# Patient Record
Sex: FEMALE | Race: WHITE | Employment: OTHER | URBAN - METROPOLITAN AREA
[De-identification: names, ages, dates, MRNs, and addresses within clinical notes are randomized per-mention and may not be internally consistent; named-entity substitution may affect disease eponyms.]

---

## 2018-09-15 ENCOUNTER — HOSPITAL ENCOUNTER (OUTPATIENT)
Dept: NUTRITION | Facility: HOSPITAL | Age: 58
End: 2018-09-16
Attending: INTERNAL MEDICINE | Admitting: INTERNAL MEDICINE

## 2018-09-15 LAB
ABS NEUT (OLG): 5.41 X10(3)/MCL (ref 2.1–9.2)
ALBUMIN SERPL-MCNC: 3.5 GM/DL (ref 3.4–5)
ALBUMIN/GLOB SERPL: 1 {RATIO}
ALP SERPL-CCNC: 67 UNIT/L (ref 45–117)
ALT SERPL-CCNC: 26 UNIT/L (ref 13–56)
AMPHET UR QL SCN: NEGATIVE
APPEARANCE, UA: CLEAR
AST SERPL-CCNC: 41 UNIT/L (ref 15–37)
BARBITURATE SCN PRESENT UR: NEGATIVE
BASOPHILS # BLD AUTO: 0.06 X10(3)/MCL (ref 0–0.2)
BASOPHILS NFR BLD AUTO: 0.6 % (ref 0–1)
BENZODIAZ UR QL SCN: NEGATIVE
BILIRUB SERPL-MCNC: 0.8 MG/DL (ref 0.2–1)
BILIRUB UR QL STRIP: NEGATIVE
BILIRUBIN DIRECT+TOT PNL SERPL-MCNC: 0.1 MG/DL (ref 0–0.2)
BILIRUBIN DIRECT+TOT PNL SERPL-MCNC: 0.7 MG/DL (ref 0–1)
BUN SERPL-MCNC: 12 MG/DL (ref 7–18)
CALCIUM SERPL-MCNC: 8.7 MG/DL (ref 8.5–10.1)
CANNABINOIDS UR QL SCN: NEGATIVE
CHLORIDE SERPL-SCNC: 104 MMOL/L (ref 98–107)
CO2 SERPL-SCNC: 25 MMOL/L (ref 21–32)
COCAINE UR QL SCN: ABNORMAL
COLOR UR: YELLOW
CREAT SERPL-MCNC: 1.01 MG/DL (ref 0.55–1.02)
EOSINOPHIL # BLD AUTO: 0.14 X10(3)/MCL (ref 0–0.9)
EOSINOPHIL NFR BLD AUTO: 1.5 % (ref 0–6.4)
ERYTHROCYTE [DISTWIDTH] IN BLOOD BY AUTOMATED COUNT: 12.9 % (ref 11.5–17)
GLOBULIN SER-MCNC: 5 GM/DL (ref 2–4)
GLUCOSE (UA): NEGATIVE
GLUCOSE SERPL-MCNC: 89 MG/DL (ref 74–106)
HCO3 UR-SCNC: 28.4 MMOL/L (ref 22–26)
HCT VFR BLD AUTO: 51.1 % (ref 37–47)
HGB BLD-MCNC: 16.6 GM/DL (ref 12–16)
HGB UR QL STRIP: NEGATIVE
IMM GRANULOCYTES # BLD AUTO: 0.06 10*3/UL (ref 0–0.02)
IMM GRANULOCYTES NFR BLD AUTO: 0.6 % (ref 0–0.43)
KETONES UR QL STRIP: NEGATIVE
LACTATE SERPL-SCNC: 1.9 MMOL/L (ref 0.4–2)
LEUKOCYTE ESTERASE UR QL STRIP: NEGATIVE
LYMPHOCYTES # BLD AUTO: 2.67 X10(3)/MCL (ref 0.6–4.6)
LYMPHOCYTES NFR BLD AUTO: 28.5 % (ref 16–44)
MCH RBC QN AUTO: 32.2 PG (ref 27–31)
MCHC RBC AUTO-ENTMCNC: 32.5 GM/DL (ref 33–36)
MCV RBC AUTO: 99.2 FL (ref 80–94)
METHADONE UR QL SCN: NEGATIVE
MONOCYTES # BLD AUTO: 1.02 X10(3)/MCL (ref 0.1–1.3)
MONOCYTES NFR BLD AUTO: 10.9 % (ref 4–12.1)
NEUTROPHILS # BLD AUTO: 5.41 X10(3)/MCL (ref 2.1–9.2)
NEUTROPHILS NFR BLD AUTO: 57.9 % (ref 43–73)
NITRITE UR QL STRIP: NEGATIVE
NRBC BLD AUTO-RTO: 0 % (ref 0–0.2)
OPIATES UR QL SCN: NEGATIVE
PCO2 BLDA: 32 MMHG (ref 35–45)
PCP UR QL: NEGATIVE
PH SMN: 7.55 [PH] (ref 7.35–7.45)
PH UR STRIP.AUTO: 7 [PH] (ref 5–7.5)
PH UR STRIP: 7 [PH] (ref 5–7)
PLATELET # BLD AUTO: 213 X10(3)/MCL (ref 130–400)
PMV BLD AUTO: 9.7 FL (ref 7.4–10.4)
PO2 BLDA: 129 MMHG (ref 80–100)
POC ALLENS TEST: NEGATIVE
POC BE: 6 MMOL/L (ref -2–3)
POC SAMPLESOURCE: NORMAL
POC SATURATED O2: 99 % (ref 96–97)
POC SITE: NORMAL
POC TCO2: 29 MMOL/L (ref 24–29)
POTASSIUM SERPL-SCNC: 5.1 MMOL/L (ref 3.5–5.1)
PROT SERPL-MCNC: 8.4 GM/DL (ref 6.4–8.2)
PROT UR QL STRIP: NEGATIVE
RBC # BLD AUTO: 5.15 X10(6)/MCL (ref 4.2–5.4)
SODIUM SERPL-SCNC: 137 MMOL/L (ref 136–145)
SP GR UR STRIP: 1.01 (ref 1–1.03)
TROPONIN I SERPL-MCNC: <0.02 NG/ML (ref 0.02–0.49)
UROBILINOGEN UR STRIP-ACNC: NEGATIVE
WBC # SPEC AUTO: 9.4 X10(3)/MCL (ref 4.5–11.5)

## 2018-09-16 LAB
ERYTHROCYTE [DISTWIDTH] IN BLOOD BY AUTOMATED COUNT: 12.4 % (ref 11.5–17)
HCT VFR BLD AUTO: 50.3 % (ref 37–47)
HGB BLD-MCNC: 16 GM/DL (ref 12–16)
MCH RBC QN AUTO: 32.3 PG (ref 27–31)
MCHC RBC AUTO-ENTMCNC: 31.8 GM/DL (ref 33–36)
MCV RBC AUTO: 101.4 FL (ref 80–94)
PLATELET # BLD AUTO: 227 X10(3)/MCL (ref 130–400)
PMV BLD AUTO: 9.7 FL (ref 9.4–12.4)
RBC # BLD AUTO: 4.96 X10(6)/MCL (ref 4.2–5.4)
WBC # SPEC AUTO: 12.9 X10(3)/MCL (ref 4.5–11.5)

## 2022-05-02 NOTE — HISTORICAL [facility]ER
This is a historical note converted from Nicole. Formatting and pictures may have been removed.  Please reference Nicole for original formatting and attached multimedia. Chief Complaint  pt reports cough x 1 month denies fever. Has taken amoxcillin 500 mg  History of Present Illness  58-year-old white female?with past medical history of COPD,schizophrenia, bipolar?who presented to the ER today with complaints of shortness of breath gets been going on and off for the past 1 monthbut now has been?getting progressively worse to the point that?she is not able to walk without shortness of breath.? Patient reports no fever no chills?.? Chest x-ray done in the ER was negative.? Shes been started on steroids?and has been admitted to hospitalist service for further management and care.? Patient reports?she does cocaine?in the last cocaine use was this morning?did not?do it IV but instead she sniffed it.? UDS was positive for cocaine?otherwise all other lab work looks good?except for slightly elevated H&H.  Review of Systems  except as documented all other systems reviewed and negative  Physical Exam  General:?well-developed well-nourished in no acute distress  Eye: PERRLA, EOMI,  HENT:?atraumatic normocephalic  Neck: full range of motion,  Respiratory:?decreased air entry, occasional wheezing  Cardiovascular:?regular rate and rhythm without murmurs,  Gastrointestinal:?soft, non-tender, non-distended  Genitourinary: no CVA tenderness to palpation  Musculoskeletal:?full range of motion of all extremities/spine without limitation or discomfort  Integumentary: no rashes or skin lesions present  Neurologic: alert awake  ?  ?  Assessment/Plan  COPD with acute exacerbation  Cough  ?  cocaine abuse  ?  patient has been started on IV steroids, Levaquin, DuoNeb?every 4 hours and when necessary. no beta blocker secondary to cocaine?history.? Right now patient is saturating?around 90% on room air.? I will also order 2-D echo?as patient  does report she does have a history of heart valve disorder?? But chest x-ray as such did not show any pulmonary edema.? I will also order one set of cardiac enzyme  ?  ?  Prophylaxis: Lovenox   Problem List/Past Medical History  Ongoing  Cholesterol  Cocaine user  COPD - Chronic obstructive pulmonary disease  Heart valve disorder  Hepatitis C  Historical  No qualifying data  Procedure/Surgical History  c section  lt leg  rt arm  rt leg  uterine fibroids x 2   Medications  Inpatient  aspirin, 325 mg= 1 tab(s), Oral, Daily  cloniDINE 0.1 mg oral tablet, 0.1 mg= 1 tab(s), Oral, q6hr, PRN  DuoNeb 0.5 mg-2.5 mg/3 mL inhalation solution, 3 mL, NEB, q4hr  levofloxacin IVPB ( Levaquin ), 750 mg= 150 mL, IV, q24hr  Norco 5 mg-325 mg oral tablet, 1 tab(s), Oral, q4hr, PRN  Solumedrol IV push / IM, 80 mg= 2 mL, IV Push, [id number]  Xanax 0.25 mg oral tablet, 0.25 mg= 1 tab(s), Oral, TID, PRN  Zofran  Home  QUETIAPINE FUMARATE 200 MG TABS, 200 mg= 1 tab(s), Oral, qPM  Seroquel 300 mg tablet, 600 mg= 2 tab(s), Oral, qPM  SYMBICORT -4.5  Allergies  No Known Medication Allergies  Social History  Alcohol  Current, Liquor, 1-2 times per week, 09/15/2018  Substance Abuse  Current, Cocaine, 1-2 times per month, 09/15/2018  Tobacco  Current every day smoker Use:. Cigarettes Type:. 10 per day., 09/15/2018

## 2023-11-09 ENCOUNTER — OFFICE VISIT (OUTPATIENT)
Dept: FAMILY MEDICINE | Facility: CLINIC | Age: 63
End: 2023-11-09
Payer: MEDICARE

## 2023-11-09 ENCOUNTER — TELEPHONE (OUTPATIENT)
Dept: FAMILY MEDICINE | Facility: CLINIC | Age: 63
End: 2023-11-09

## 2023-11-09 ENCOUNTER — LAB VISIT (OUTPATIENT)
Dept: LAB | Facility: HOSPITAL | Age: 63
End: 2023-11-09
Attending: NURSE PRACTITIONER
Payer: MEDICARE

## 2023-11-09 VITALS
SYSTOLIC BLOOD PRESSURE: 148 MMHG | RESPIRATION RATE: 18 BRPM | OXYGEN SATURATION: 94 % | HEART RATE: 86 BPM | BODY MASS INDEX: 34.34 KG/M2 | WEIGHT: 218.81 LBS | HEIGHT: 67 IN | TEMPERATURE: 98 F | DIASTOLIC BLOOD PRESSURE: 78 MMHG

## 2023-11-09 DIAGNOSIS — F33.1 MODERATE EPISODE OF RECURRENT MAJOR DEPRESSIVE DISORDER: ICD-10-CM

## 2023-11-09 DIAGNOSIS — F31.12 BIPOLAR AFFECTIVE DISORDER, CURRENTLY MANIC, MODERATE: ICD-10-CM

## 2023-11-09 DIAGNOSIS — G45.8 OTHER SPECIFIED TRANSIENT CEREBRAL ISCHEMIAS: ICD-10-CM

## 2023-11-09 DIAGNOSIS — J44.9 CHRONIC OBSTRUCTIVE PULMONARY DISEASE, UNSPECIFIED COPD TYPE: ICD-10-CM

## 2023-11-09 DIAGNOSIS — R79.9 ABNORMAL BLOOD CHEMISTRY: ICD-10-CM

## 2023-11-09 DIAGNOSIS — R03.0 ELEVATED BLOOD PRESSURE READING: ICD-10-CM

## 2023-11-09 DIAGNOSIS — L84 CALLUS OF FOOT: ICD-10-CM

## 2023-11-09 DIAGNOSIS — R29.818 NEUROLOGICAL ABNORMALITY: Primary | ICD-10-CM

## 2023-11-09 DIAGNOSIS — I38 VALVULAR HEART DISEASE: ICD-10-CM

## 2023-11-09 DIAGNOSIS — M16.0 PRIMARY OSTEOARTHRITIS OF BOTH HIPS: ICD-10-CM

## 2023-11-09 DIAGNOSIS — I73.9 PVD (PERIPHERAL VASCULAR DISEASE): ICD-10-CM

## 2023-11-09 PROBLEM — H26.9 CATARACTS, BILATERAL: Status: ACTIVE | Noted: 2023-11-09

## 2023-11-09 PROBLEM — F31.9 BIPOLAR DISORDER: Status: ACTIVE | Noted: 2023-11-09

## 2023-11-09 PROBLEM — F32.A DEPRESSION: Status: ACTIVE | Noted: 2023-11-09

## 2023-11-09 LAB
ALBUMIN SERPL-MCNC: 3.9 G/DL (ref 3.4–4.8)
ALBUMIN/GLOB SERPL: 0.8 RATIO (ref 1.1–2)
ALP SERPL-CCNC: 60 UNIT/L (ref 40–150)
ALT SERPL-CCNC: 19 UNIT/L (ref 0–55)
APPEARANCE UR: ABNORMAL
AST SERPL-CCNC: 26 UNIT/L (ref 5–34)
BACTERIA #/AREA URNS AUTO: ABNORMAL /HPF
BASOPHILS # BLD AUTO: 0.09 X10(3)/MCL
BASOPHILS NFR BLD AUTO: 0.9 %
BILIRUB SERPL-MCNC: 0.8 MG/DL
BILIRUB UR QL STRIP.AUTO: ABNORMAL
BUN SERPL-MCNC: 10.3 MG/DL (ref 9.8–20.1)
CALCIUM SERPL-MCNC: 9.9 MG/DL (ref 8.4–10.2)
CHLORIDE SERPL-SCNC: 105 MMOL/L (ref 98–107)
CO2 SERPL-SCNC: 27 MMOL/L (ref 23–31)
COLOR UR AUTO: YELLOW
CREAT SERPL-MCNC: 0.77 MG/DL (ref 0.55–1.02)
EOSINOPHIL # BLD AUTO: 0.14 X10(3)/MCL (ref 0–0.9)
EOSINOPHIL NFR BLD AUTO: 1.4 %
ERYTHROCYTE [DISTWIDTH] IN BLOOD BY AUTOMATED COUNT: 12.8 % (ref 11.5–17)
EST. AVERAGE GLUCOSE BLD GHB EST-MCNC: 105.4 MG/DL
GFR SERPLBLD CREATININE-BSD FMLA CKD-EPI: >60 MLS/MIN/1.73/M2
GLOBULIN SER-MCNC: 4.8 GM/DL (ref 2.4–3.5)
GLUCOSE SERPL-MCNC: 97 MG/DL (ref 82–115)
GLUCOSE UR QL STRIP.AUTO: NEGATIVE
HBA1C MFR BLD: 5.3 %
HCT VFR BLD AUTO: 53.7 % (ref 37–47)
HGB BLD-MCNC: 17.5 G/DL (ref 12–16)
IMM GRANULOCYTES # BLD AUTO: 0.05 X10(3)/MCL (ref 0–0.04)
IMM GRANULOCYTES NFR BLD AUTO: 0.5 %
KETONES UR QL STRIP.AUTO: NEGATIVE
LEUKOCYTE ESTERASE UR QL STRIP.AUTO: NEGATIVE
LYMPHOCYTES # BLD AUTO: 2.21 X10(3)/MCL (ref 0.6–4.6)
LYMPHOCYTES NFR BLD AUTO: 22.3 %
MCH RBC QN AUTO: 32.3 PG (ref 27–31)
MCHC RBC AUTO-ENTMCNC: 32.6 G/DL (ref 33–36)
MCV RBC AUTO: 99.1 FL (ref 80–94)
MONOCYTES # BLD AUTO: 1.02 X10(3)/MCL (ref 0.1–1.3)
MONOCYTES NFR BLD AUTO: 10.3 %
NEUTROPHILS # BLD AUTO: 6.4 X10(3)/MCL (ref 2.1–9.2)
NEUTROPHILS NFR BLD AUTO: 64.6 %
NITRITE UR QL STRIP.AUTO: NEGATIVE
NRBC BLD AUTO-RTO: 0 %
PH UR STRIP.AUTO: 5 [PH]
PLATELET # BLD AUTO: 242 X10(3)/MCL (ref 130–400)
PMV BLD AUTO: 9.7 FL (ref 7.4–10.4)
POTASSIUM SERPL-SCNC: 4.4 MMOL/L (ref 3.5–5.1)
PROT SERPL-MCNC: 8.7 GM/DL (ref 5.8–7.6)
PROT UR QL STRIP.AUTO: 100
RBC # BLD AUTO: 5.42 X10(6)/MCL (ref 4.2–5.4)
RBC #/AREA URNS AUTO: ABNORMAL /HPF
RBC UR QL AUTO: NEGATIVE
SODIUM SERPL-SCNC: 141 MMOL/L (ref 136–145)
SP GR UR STRIP.AUTO: >=1.03 (ref 1–1.03)
SQUAMOUS #/AREA URNS AUTO: ABNORMAL /HPF
TSH SERPL-ACNC: 0.75 UIU/ML (ref 0.35–4.94)
UROBILINOGEN UR STRIP-ACNC: 1
WBC # SPEC AUTO: 9.91 X10(3)/MCL (ref 4.5–11.5)
WBC #/AREA URNS AUTO: ABNORMAL /HPF

## 2023-11-09 PROCEDURE — 3044F HG A1C LEVEL LT 7.0%: CPT | Mod: CPTII,,, | Performed by: NURSE PRACTITIONER

## 2023-11-09 PROCEDURE — 36415 COLL VENOUS BLD VENIPUNCTURE: CPT

## 2023-11-09 PROCEDURE — 83036 HEMOGLOBIN GLYCOSYLATED A1C: CPT

## 2023-11-09 PROCEDURE — 3077F SYST BP >= 140 MM HG: CPT | Mod: CPTII,,, | Performed by: NURSE PRACTITIONER

## 2023-11-09 PROCEDURE — 3008F BODY MASS INDEX DOCD: CPT | Mod: CPTII,,, | Performed by: NURSE PRACTITIONER

## 2023-11-09 PROCEDURE — 3288F PR FALLS RISK ASSESSMENT DOCUMENTED: ICD-10-PCS | Mod: CPTII,,, | Performed by: NURSE PRACTITIONER

## 2023-11-09 PROCEDURE — 99204 OFFICE O/P NEW MOD 45 MIN: CPT | Mod: ,,, | Performed by: NURSE PRACTITIONER

## 2023-11-09 PROCEDURE — 1100F PR PT FALLS ASSESS DOC 2+ FALLS/FALL W/INJURY/YR: ICD-10-PCS | Mod: CPTII,,, | Performed by: NURSE PRACTITIONER

## 2023-11-09 PROCEDURE — 84443 ASSAY THYROID STIM HORMONE: CPT

## 2023-11-09 PROCEDURE — 99204 PR OFFICE/OUTPT VISIT, NEW, LEVL IV, 45-59 MIN: ICD-10-PCS | Mod: ,,, | Performed by: NURSE PRACTITIONER

## 2023-11-09 PROCEDURE — 1159F PR MEDICATION LIST DOCUMENTED IN MEDICAL RECORD: ICD-10-PCS | Mod: CPTII,,, | Performed by: NURSE PRACTITIONER

## 2023-11-09 PROCEDURE — 1159F MED LIST DOCD IN RCRD: CPT | Mod: CPTII,,, | Performed by: NURSE PRACTITIONER

## 2023-11-09 PROCEDURE — 1160F RVW MEDS BY RX/DR IN RCRD: CPT | Mod: CPTII,,, | Performed by: NURSE PRACTITIONER

## 2023-11-09 PROCEDURE — 80053 COMPREHEN METABOLIC PANEL: CPT

## 2023-11-09 PROCEDURE — 3077F PR MOST RECENT SYSTOLIC BLOOD PRESSURE >= 140 MM HG: ICD-10-PCS | Mod: CPTII,,, | Performed by: NURSE PRACTITIONER

## 2023-11-09 PROCEDURE — 3288F FALL RISK ASSESSMENT DOCD: CPT | Mod: CPTII,,, | Performed by: NURSE PRACTITIONER

## 2023-11-09 PROCEDURE — 1100F PTFALLS ASSESS-DOCD GE2>/YR: CPT | Mod: CPTII,,, | Performed by: NURSE PRACTITIONER

## 2023-11-09 PROCEDURE — 3008F PR BODY MASS INDEX (BMI) DOCUMENTED: ICD-10-PCS | Mod: CPTII,,, | Performed by: NURSE PRACTITIONER

## 2023-11-09 PROCEDURE — 3078F PR MOST RECENT DIASTOLIC BLOOD PRESSURE < 80 MM HG: ICD-10-PCS | Mod: CPTII,,, | Performed by: NURSE PRACTITIONER

## 2023-11-09 PROCEDURE — 3078F DIAST BP <80 MM HG: CPT | Mod: CPTII,,, | Performed by: NURSE PRACTITIONER

## 2023-11-09 PROCEDURE — 85025 COMPLETE CBC W/AUTO DIFF WBC: CPT

## 2023-11-09 PROCEDURE — 1160F PR REVIEW ALL MEDS BY PRESCRIBER/CLIN PHARMACIST DOCUMENTED: ICD-10-PCS | Mod: CPTII,,, | Performed by: NURSE PRACTITIONER

## 2023-11-09 PROCEDURE — 3044F PR MOST RECENT HEMOGLOBIN A1C LEVEL <7.0%: ICD-10-PCS | Mod: CPTII,,, | Performed by: NURSE PRACTITIONER

## 2023-11-09 RX ORDER — NAPROXEN 500 MG/1
500 TABLET ORAL 2 TIMES DAILY WITH MEALS
Qty: 60 TABLET | Refills: 0 | Status: SHIPPED | OUTPATIENT
Start: 2023-11-09

## 2023-11-09 RX ORDER — BUDESONIDE AND FORMOTEROL FUMARATE DIHYDRATE 160; 4.5 UG/1; UG/1
2 AEROSOL RESPIRATORY (INHALATION) EVERY 12 HOURS
Qty: 10.2 G | Refills: 3 | Status: SHIPPED | OUTPATIENT
Start: 2023-11-09 | End: 2024-03-08 | Stop reason: SDUPTHER

## 2023-11-09 RX ORDER — QUETIAPINE FUMARATE 200 MG/1
200 TABLET, FILM COATED ORAL NIGHTLY
COMMUNITY
Start: 2023-09-12

## 2023-11-09 RX ORDER — QUETIAPINE FUMARATE 300 MG/1
300 TABLET, FILM COATED ORAL 2 TIMES DAILY
COMMUNITY
Start: 2023-09-12

## 2023-11-09 NOTE — PROGRESS NOTES
Please inform of labs results    1) liver, kidney, thyroid, WNL; No DM  Rx for Naproxen sent to [facility]    2) blood count is elevated; this is due to hx of smoking; we will monitor; instruct to decrease daily cigarette intake; we will monitor  Remaining labs without any concerning findings    3) Urine negative for UTI; but she does have protein in urine  I will repeat this at next appt    Keep appt for follow up

## 2023-11-09 NOTE — ASSESSMENT & PLAN NOTE
Stable  Continue Seroquel 200 mg po daily and Seroquel 300 mg po daily.   Keep appt with Physiatrist is Dr. Lisa Jackson at [facility].

## 2023-11-09 NOTE — ASSESSMENT & PLAN NOTE
Neuro exam intact today  Will obtain CT head  Referral placed to Neurology  Long discussion with patient; instructed to report to ED for any stroke-like symptoms  Instructed to continue to monitor symptoms  Call 911 for any weakness, syncope, dizziness, slurred speech, one-sided numbness/tingling, CP, SOB, and/or worsening symptoms  Pt is agreeable to plan and verbalizes understanding

## 2023-11-09 NOTE — ASSESSMENT & PLAN NOTE
+ chronic cough/wheezing  Resume Symbicort; Rx sent  Continue nebulizer prn  Refer to Pulmonology for PFT

## 2023-11-09 NOTE — TELEPHONE ENCOUNTER
----- Message from Aviva Jones sent at 11/9/2023  2:28 PM CST -----  Regarding: advice  Type:  Needs Medical Advice    Who Called: Kimberley from [facility]  Symptoms (please be specific):    How long has patient had these symptoms:    Pharmacy name and phone #:    Would the patient rather a call back or a response via [facility]?   Best Call Back Number: [phone]  Additional Information: called about pt needing an order for a scooter. Please advise (fax [phone]
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
## 2023-11-13 NOTE — TELEPHONE ENCOUNTER
Returned call to [facility]. Spoke to Becky.   Stated and requested to Becky that paperwork is needing to be faxed to this office to start process for scooter. Becky stated that approval is pending by case management team for DME equipment. Once approved paperwork will be faxed to this office for NP to fill out.  Reference ID#: [id number]

## 2023-11-16 ENCOUNTER — TELEPHONE (OUTPATIENT)
Dept: FAMILY MEDICINE | Facility: CLINIC | Age: 63
End: 2023-11-16
Payer: MEDICARE

## 2023-11-16 NOTE — TELEPHONE ENCOUNTER
----- Message from Lakeisha Dunlap LPN sent at 11/16/2023  2:50 PM CST -----  Regarding: FW: med advice    ----- Message -----  From: Karen Cook  Sent: 11/16/2023   9:22 AM CST  To: Kip Padilla Staff  Subject: med advice                                       .Type:  Needs Medical Advice    Who Called: Pt  Symptoms (please be specific):    How long has patient had these symptoms:    Pharmacy name and phone #:    Would the patient rather a call back or a response via [facility]? Call back  Best Call Back Number: [phone]  Additional Information: Pt wants to know if she can come and give urine Monday since her ultrasound and CT scan was rescheduled.

## 2023-11-16 NOTE — TELEPHONE ENCOUNTER
I have that patient has a u/s and ct scan scheduled for Monday 11.20.23. I called patient and no answer. Left message to return call to office. Gilda

## 2023-11-17 NOTE — TELEPHONE ENCOUNTER
Patient mentioned she needed to repeat at urine test on Thursday.  I advised her of the below per Miguel :  3) Urine negative for UTI; but she does have protein in urine  I will repeat this at next appt      I advised her that her next appointment with Miguel is on Tuesday 12.12.23. She voiced understanding. Patient also advised to keep her CT and U/S appointment scheduled for Monday 11.20.23. She voiced understanding. I explained to her where to go for her scans. She voiced understanding. Gilda

## 2023-11-20 ENCOUNTER — HOSPITAL ENCOUNTER (OUTPATIENT)
Dept: RADIOLOGY | Facility: HOSPITAL | Age: 63
Discharge: HOME | End: 2023-11-20
Attending: NURSE PRACTITIONER
Payer: MEDICARE

## 2023-11-20 DIAGNOSIS — G45.8 OTHER SPECIFIED TRANSIENT CEREBRAL ISCHEMIAS: ICD-10-CM

## 2023-11-20 DIAGNOSIS — R29.818 NEUROLOGICAL ABNORMALITY: ICD-10-CM

## 2023-11-20 DIAGNOSIS — I73.9 PVD (PERIPHERAL VASCULAR DISEASE): ICD-10-CM

## 2023-12-06 ENCOUNTER — TELEPHONE (OUTPATIENT)
Dept: FAMILY MEDICINE | Facility: CLINIC | Age: 63
End: 2023-12-06
Payer: MEDICARE

## 2023-12-06 ENCOUNTER — HOSPITAL ENCOUNTER (OUTPATIENT)
Dept: RADIOLOGY | Facility: HOSPITAL | Age: 63
Discharge: HOME | End: 2023-12-06
Attending: NURSE PRACTITIONER
Payer: MEDICARE

## 2023-12-06 DIAGNOSIS — I73.9 PVD (PERIPHERAL VASCULAR DISEASE): Primary | ICD-10-CM

## 2023-12-06 PROCEDURE — 70450 CT HEAD/BRAIN W/O DYE: CPT | Mod: TC

## 2023-12-06 PROCEDURE — 93925 LOWER EXTREMITY STUDY: CPT | Mod: TC

## 2023-12-06 NOTE — TELEPHONE ENCOUNTER
----- Message from Lesly Nathan NP sent at 12/6/2023  2:27 PM CST -----  Please inform of CT results    CT reveals no acute changes, but there are some changes in her brain that needs to be addressed  Recently referred to Neurology, has she received an appt.?

## 2023-12-06 NOTE — PROGRESS NOTES
Please inform of US result    1) US reveals that she likely has severe blockage in her legs which needs further testing    I will place referral for vascular doctor in order to obtain further testing of her legs

## 2023-12-06 NOTE — TELEPHONE ENCOUNTER
----- Message from Trisha Aranda sent at 12/6/2023  4:07 PM CST -----  .Type:  Patient Returning Call    Who Called:Mode gordon  Who Left Message for Patient:Mode  Does the patient know what this is regarding?:Referral  Would the patient rather a call back or a response via [facility]?   Best Call Back Number:[phone]  Additional Information: Please call back about referral

## 2023-12-06 NOTE — TELEPHONE ENCOUNTER
Referral sent to Neurology on 11/9/23. Pt states no appt received.   Please follow up on referral; call pt if there is an appt. Made with date and time.  Thanks...

## 2023-12-07 ENCOUNTER — TELEPHONE (OUTPATIENT)
Dept: FAMILY MEDICINE | Facility: CLINIC | Age: 63
End: 2023-12-07
Payer: MEDICARE

## 2023-12-07 DIAGNOSIS — I73.9 CLAUDICATION: ICD-10-CM

## 2023-12-07 DIAGNOSIS — I73.9 PVD (PERIPHERAL VASCULAR DISEASE): Primary | ICD-10-CM
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
## 2023-12-13 ENCOUNTER — OFFICE VISIT (OUTPATIENT)
Dept: VASCULAR SURGERY | Facility: CLINIC | Age: 63
End: 2023-12-13
Payer: MEDICARE

## 2023-12-13 VITALS
BODY MASS INDEX: 34.21 KG/M2 | SYSTOLIC BLOOD PRESSURE: 151 MMHG | DIASTOLIC BLOOD PRESSURE: 91 MMHG | HEART RATE: 81 BPM | HEIGHT: 67 IN | WEIGHT: 218 LBS

## 2023-12-13 DIAGNOSIS — I70.211 ATHEROSCLEROSIS OF NATIVE ARTERY OF RIGHT LOWER EXTREMITY WITH INTERMITTENT CLAUDICATION: ICD-10-CM

## 2023-12-13 DIAGNOSIS — G45.9 TIA (TRANSIENT ISCHEMIC ATTACK): ICD-10-CM

## 2023-12-13 DIAGNOSIS — I73.9 PVD (PERIPHERAL VASCULAR DISEASE): ICD-10-CM

## 2023-12-13 DIAGNOSIS — I70.212 ATHEROSCLEROSIS OF NATIVE ARTERY OF LEFT LOWER EXTREMITY WITH INTERMITTENT CLAUDICATION: Primary | ICD-10-CM

## 2023-12-13 DIAGNOSIS — I73.9 CLAUDICATION: ICD-10-CM

## 2023-12-13 PROCEDURE — 1100F PTFALLS ASSESS-DOCD GE2>/YR: CPT | Mod: CPTII,,, | Performed by: SURGERY

## 2023-12-13 PROCEDURE — 1100F PR PT FALLS ASSESS DOC 2+ FALLS/FALL W/INJURY/YR: ICD-10-PCS | Mod: CPTII,,, | Performed by: SURGERY

## 2023-12-13 PROCEDURE — 3008F PR BODY MASS INDEX (BMI) DOCUMENTED: ICD-10-PCS | Mod: CPTII,,, | Performed by: SURGERY

## 2023-12-13 PROCEDURE — 3008F BODY MASS INDEX DOCD: CPT | Mod: CPTII,,, | Performed by: SURGERY

## 2023-12-13 PROCEDURE — 1160F PR REVIEW ALL MEDS BY PRESCRIBER/CLIN PHARMACIST DOCUMENTED: ICD-10-PCS | Mod: CPTII,,, | Performed by: SURGERY

## 2023-12-13 PROCEDURE — 3080F DIAST BP >= 90 MM HG: CPT | Mod: CPTII,,, | Performed by: SURGERY

## 2023-12-13 PROCEDURE — 1159F MED LIST DOCD IN RCRD: CPT | Mod: CPTII,,, | Performed by: SURGERY

## 2023-12-13 PROCEDURE — 3288F PR FALLS RISK ASSESSMENT DOCUMENTED: ICD-10-PCS | Mod: CPTII,,, | Performed by: SURGERY

## 2023-12-13 PROCEDURE — 99205 PR OFFICE/OUTPT VISIT, NEW, LEVL V, 60-74 MIN: ICD-10-PCS | Mod: ,,, | Performed by: SURGERY

## 2023-12-13 PROCEDURE — 3288F FALL RISK ASSESSMENT DOCD: CPT | Mod: CPTII,,, | Performed by: SURGERY

## 2023-12-13 PROCEDURE — 3044F PR MOST RECENT HEMOGLOBIN A1C LEVEL <7.0%: ICD-10-PCS | Mod: CPTII,,, | Performed by: SURGERY

## 2023-12-13 PROCEDURE — 3044F HG A1C LEVEL LT 7.0%: CPT | Mod: CPTII,,, | Performed by: SURGERY

## 2023-12-13 PROCEDURE — 1160F RVW MEDS BY RX/DR IN RCRD: CPT | Mod: CPTII,,, | Performed by: SURGERY

## 2023-12-13 PROCEDURE — 3077F PR MOST RECENT SYSTOLIC BLOOD PRESSURE >= 140 MM HG: ICD-10-PCS | Mod: CPTII,,, | Performed by: SURGERY

## 2023-12-13 PROCEDURE — 3080F PR MOST RECENT DIASTOLIC BLOOD PRESSURE >= 90 MM HG: ICD-10-PCS | Mod: CPTII,,, | Performed by: SURGERY

## 2023-12-13 PROCEDURE — 1159F PR MEDICATION LIST DOCUMENTED IN MEDICAL RECORD: ICD-10-PCS | Mod: CPTII,,, | Performed by: SURGERY

## 2023-12-13 PROCEDURE — 3077F SYST BP >= 140 MM HG: CPT | Mod: CPTII,,, | Performed by: SURGERY

## 2023-12-13 PROCEDURE — 99205 OFFICE O/P NEW HI 60 MIN: CPT | Mod: ,,, | Performed by: SURGERY

## 2023-12-14 PROBLEM — G45.9 TIA (TRANSIENT ISCHEMIC ATTACK): Status: ACTIVE | Noted: 2023-12-14

## 2023-12-22 DIAGNOSIS — I73.9 PERIPHERAL VASCULAR DISEASE, UNSPECIFIED: ICD-10-CM

## 2023-12-22 DIAGNOSIS — I70.212 ATHEROSCLEROSIS OF NATIVE ARTERY OF LEFT LOWER EXTREMITY WITH INTERMITTENT CLAUDICATION: Primary | ICD-10-CM

## 2023-12-22 DIAGNOSIS — I70.211 ATHEROSCLEROSIS OF NATIVE ARTERY OF RIGHT LOWER EXTREMITY WITH INTERMITTENT CLAUDICATION: ICD-10-CM

## 2024-01-02 ENCOUNTER — CLINICAL SUPPORT (OUTPATIENT)
Dept: VASCULAR SURGERY | Facility: CLINIC | Age: 64
End: 2024-01-02
Payer: MEDICARE

## 2024-01-04 ENCOUNTER — TELEPHONE (OUTPATIENT)
Dept: FAMILY MEDICINE | Facility: CLINIC | Age: 64
End: 2024-01-04
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
## 2024-01-04 NOTE — TELEPHONE ENCOUNTER
----- Message from Aurora Knox sent at 1/4/2024  8:18 AM CST -----  Regarding: cancel appt  .Type:  Needs Medical Advice    Who Called: pt  Best Call Back Number: [phone]  Additional Information: pt not feeling well unable to make Todays appt   Pt request mess.

## 2024-01-04 NOTE — TELEPHONE ENCOUNTER
Noted...  Pt phoned office; is unable to make appointment today, not feeling well, will call back to r/s.

## 2024-01-10 ENCOUNTER — OFFICE VISIT (OUTPATIENT)
Dept: VASCULAR SURGERY | Facility: CLINIC | Age: 64
End: 2024-01-10
Payer: MEDICARE

## 2024-01-10 VITALS
HEART RATE: 72 BPM | BODY MASS INDEX: 34.21 KG/M2 | DIASTOLIC BLOOD PRESSURE: 74 MMHG | HEIGHT: 67 IN | SYSTOLIC BLOOD PRESSURE: 146 MMHG | WEIGHT: 218 LBS

## 2024-01-10 DIAGNOSIS — F14.10 COCAINE ABUSE: ICD-10-CM

## 2024-01-10 DIAGNOSIS — I70.211 ATHEROSCLEROSIS OF NATIVE ARTERY OF RIGHT LOWER EXTREMITY WITH INTERMITTENT CLAUDICATION: ICD-10-CM

## 2024-01-10 DIAGNOSIS — I70.212 ATHEROSCLEROSIS OF NATIVE ARTERY OF LEFT LOWER EXTREMITY WITH INTERMITTENT CLAUDICATION: Primary | ICD-10-CM

## 2024-01-10 PROCEDURE — 1159F MED LIST DOCD IN RCRD: CPT | Mod: CPTII,,, | Performed by: SURGERY

## 2024-01-10 PROCEDURE — 3078F DIAST BP <80 MM HG: CPT | Mod: CPTII,,, | Performed by: SURGERY

## 2024-01-10 PROCEDURE — 1160F RVW MEDS BY RX/DR IN RCRD: CPT | Mod: CPTII,,, | Performed by: SURGERY

## 2024-01-10 PROCEDURE — 1101F PT FALLS ASSESS-DOCD LE1/YR: CPT | Mod: CPTII,,, | Performed by: SURGERY

## 2024-01-10 PROCEDURE — 3008F BODY MASS INDEX DOCD: CPT | Mod: CPTII,,, | Performed by: SURGERY

## 2024-01-10 PROCEDURE — 3074F SYST BP LT 130 MM HG: CPT | Mod: CPTII,,, | Performed by: SURGERY

## 2024-01-10 PROCEDURE — 3288F FALL RISK ASSESSMENT DOCD: CPT | Mod: CPTII,,, | Performed by: SURGERY

## 2024-01-10 PROCEDURE — 99213 OFFICE O/P EST LOW 20 MIN: CPT | Mod: ,,, | Performed by: SURGERY

## 2024-01-16 ENCOUNTER — OFFICE VISIT (OUTPATIENT)
Dept: NEUROLOGY | Facility: CLINIC | Age: 64
End: 2024-01-16
Payer: MEDICARE

## 2024-01-16 VITALS
HEIGHT: 67 IN | DIASTOLIC BLOOD PRESSURE: 82 MMHG | WEIGHT: 218 LBS | BODY MASS INDEX: 34.21 KG/M2 | SYSTOLIC BLOOD PRESSURE: 146 MMHG

## 2024-01-16 DIAGNOSIS — F17.200 SMOKING: ICD-10-CM

## 2024-01-16 DIAGNOSIS — I73.9 PVD (PERIPHERAL VASCULAR DISEASE): ICD-10-CM

## 2024-01-16 DIAGNOSIS — R29.818 NEUROLOGICAL ABNORMALITY: ICD-10-CM

## 2024-01-16 DIAGNOSIS — J44.9 CHRONIC OBSTRUCTIVE PULMONARY DISEASE, UNSPECIFIED COPD TYPE: ICD-10-CM

## 2024-01-16 DIAGNOSIS — I50.9 CONGESTIVE HEART FAILURE, UNSPECIFIED HF CHRONICITY, UNSPECIFIED HEART FAILURE TYPE: ICD-10-CM

## 2024-01-16 DIAGNOSIS — F14.10 COCAINE ABUSE: ICD-10-CM

## 2024-01-16 DIAGNOSIS — G45.9 TIA (TRANSIENT ISCHEMIC ATTACK): Primary | ICD-10-CM

## 2024-01-16 DIAGNOSIS — J44.9 CHRONIC OBSTRUCTIVE PULMONARY DISEASE, UNSPECIFIED COPD TYPE: Primary | ICD-10-CM

## 2024-01-16 DIAGNOSIS — I63.89 OTHER CEREBRAL INFARCTION: Primary | ICD-10-CM

## 2024-01-16 DIAGNOSIS — G45.8 OTHER SPECIFIED TRANSIENT CEREBRAL ISCHEMIAS: ICD-10-CM

## 2024-01-16 PROCEDURE — 99205 OFFICE O/P NEW HI 60 MIN: CPT | Mod: S$GLB,,, | Performed by: PSYCHIATRY & NEUROLOGY

## 2024-01-16 PROCEDURE — 1159F MED LIST DOCD IN RCRD: CPT | Mod: CPTII,S$GLB,, | Performed by: PSYCHIATRY & NEUROLOGY

## 2024-01-16 PROCEDURE — 3077F SYST BP >= 140 MM HG: CPT | Mod: CPTII,S$GLB,, | Performed by: PSYCHIATRY & NEUROLOGY

## 2024-01-16 PROCEDURE — 3008F BODY MASS INDEX DOCD: CPT | Mod: CPTII,S$GLB,, | Performed by: PSYCHIATRY & NEUROLOGY

## 2024-01-16 PROCEDURE — 99999 PR PBB SHADOW E&M-EST. PATIENT-LVL III: CPT | Mod: PBBFAC,,, | Performed by: PSYCHIATRY & NEUROLOGY

## 2024-01-16 PROCEDURE — 3079F DIAST BP 80-89 MM HG: CPT | Mod: CPTII,S$GLB,, | Performed by: PSYCHIATRY & NEUROLOGY

## 2024-01-16 RX ORDER — IBUPROFEN 200 MG
1 TABLET ORAL DAILY
Qty: 30 PATCH | Refills: 0 | Status: SHIPPED | OUTPATIENT
Start: 2024-01-16 | End: 2024-02-15

## 2024-01-16 RX ORDER — NAPROXEN SODIUM 220 MG/1
81 TABLET, FILM COATED ORAL DAILY
Qty: 360 TABLET | Refills: 0 | Status: SHIPPED | OUTPATIENT
Start: 2024-01-16 | End: 2025-01-15

## 2024-01-16 RX ORDER — ATORVASTATIN CALCIUM 40 MG/1
40 TABLET, FILM COATED ORAL DAILY
Qty: 90 TABLET | Refills: 3 | Status: SHIPPED | OUTPATIENT
Start: 2024-01-16 | End: 2024-04-10
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
## 2024-01-22 ENCOUNTER — PROCEDURE VISIT (OUTPATIENT)
Dept: SLEEP MEDICINE | Facility: HOSPITAL | Age: 64
End: 2024-01-22
Attending: PSYCHIATRY & NEUROLOGY
Payer: MEDICARE

## 2024-01-22 DIAGNOSIS — J44.9 CHRONIC OBSTRUCTIVE PULMONARY DISEASE, UNSPECIFIED COPD TYPE: ICD-10-CM

## 2024-01-22 PROCEDURE — 95819 EEG AWAKE AND ASLEEP: CPT | Mod: 26,,, | Performed by: PSYCHIATRY & NEUROLOGY

## 2024-01-22 PROCEDURE — 95819 EEG AWAKE AND ASLEEP: CPT

## 2024-01-22 NOTE — PROGRESS NOTES
Routine EEG complete.    
[Time Spent: ___ minutes] : I have spent [unfilled] minutes of time on the encounter.

## 2024-01-23 NOTE — PROCEDURES
EEG    Dx: Seizure    Duration: 31 minutes    Technical: Standard digital EEG was performed at [facility]. The 10/20 international system of electrode placement was used.  Photic   stimulation was performed.    Description: The posterior dominant rhythm was 9 Hz.  There   were no lateralizing features.  The patient achieved stage 2 sleep.  There were   no epileptiform discharges or clinical seizures seen.    Impression: Normal   awake and asleep EEG.

## 2024-01-30 ENCOUNTER — TELEPHONE (OUTPATIENT)
Dept: NEUROLOGY | Facility: CLINIC | Age: 64
End: 2024-01-30
Payer: MEDICARE

## 2024-01-30 NOTE — TELEPHONE ENCOUNTER
Advised patient to contact office that is inserting IV to see if IV insertion can be done sooner. Also advised patient to report to ED for HA. Verbalized understanding.

## 2024-01-30 NOTE — TELEPHONE ENCOUNTER
Pt called reporting she has been experiencing a constant headache for 4 days. Pt c/o nausea & pain in eyes.  Pt has not taken any medication for headache.  Pt also inquired on imaging being done sooner than 3/7/24.    CB# [phone]

## 2024-02-09 ENCOUNTER — TELEPHONE (OUTPATIENT)
Dept: FAMILY MEDICINE | Facility: CLINIC | Age: 64
End: 2024-02-09
Payer: MEDICARE

## 2024-02-09 NOTE — TELEPHONE ENCOUNTER
----- Message from Litzy Carter sent at 2/9/2024  9:46 AM CST -----  Regarding: return call  Type:  Patient Returning Call    Who Called: pt       Does the patient know what this is regarding?: questions of concern before eye surgery    Would the patient rather a call back or a response via [facility]?  Yes    Best Call Back Number: [phone]    Additional Information:  pt called for return call back, pt says she has questions to ask before her eye surgery, please advise, thanks

## 2024-02-09 NOTE — TELEPHONE ENCOUNTER
I spoke with patient and she needs clearance for her eye sx. She is seeing the cardiologist on 2.14.24 for a clearance for surgery, so we can disregard the clearance form. I advised her that was fine and that Dr. Shin would of had to see her anyway to clear her. She voiced understanding. Gilda

## 2024-03-08 ENCOUNTER — OFFICE VISIT (OUTPATIENT)
Dept: FAMILY MEDICINE | Facility: CLINIC | Age: 64
End: 2024-03-08
Payer: MEDICARE

## 2024-03-08 VITALS
DIASTOLIC BLOOD PRESSURE: 76 MMHG | HEIGHT: 67 IN | HEART RATE: 73 BPM | RESPIRATION RATE: 22 BRPM | BODY MASS INDEX: 34.21 KG/M2 | WEIGHT: 218 LBS | SYSTOLIC BLOOD PRESSURE: 124 MMHG | OXYGEN SATURATION: 89 %

## 2024-03-08 DIAGNOSIS — G45.9 TIA (TRANSIENT ISCHEMIC ATTACK): ICD-10-CM

## 2024-03-08 DIAGNOSIS — B18.2 HEPATITIS C VIRUS CARRIER STATE: ICD-10-CM

## 2024-03-08 DIAGNOSIS — R73.01 ELEVATED FASTING GLUCOSE: ICD-10-CM

## 2024-03-08 DIAGNOSIS — E78.2 MIXED HYPERLIPIDEMIA: Primary | ICD-10-CM

## 2024-03-08 DIAGNOSIS — F31.75 BIPOLAR DISORDER, IN PARTIAL REMISSION, MOST RECENT EPISODE DEPRESSED: ICD-10-CM

## 2024-03-08 DIAGNOSIS — J44.9 CHRONIC OBSTRUCTIVE PULMONARY DISEASE, UNSPECIFIED COPD TYPE: ICD-10-CM

## 2024-03-08 DIAGNOSIS — I73.9 PVD (PERIPHERAL VASCULAR DISEASE): ICD-10-CM

## 2024-03-08 PROBLEM — I50.9 CHF (CONGESTIVE HEART FAILURE): Status: RESOLVED | Noted: 2024-01-16 | Resolved: 2024-03-08

## 2024-03-08 PROBLEM — F32.A DEPRESSION: Status: RESOLVED | Noted: 2023-11-09 | Resolved: 2024-03-08

## 2024-03-08 PROBLEM — R03.0 ELEVATED BLOOD PRESSURE READING: Status: RESOLVED | Noted: 2023-11-09 | Resolved: 2024-03-08

## 2024-03-08 PROBLEM — R29.818 NEUROLOGICAL ABNORMALITY: Status: RESOLVED | Noted: 2023-11-09 | Resolved: 2024-03-08

## 2024-03-08 PROBLEM — H26.9 CATARACTS, BILATERAL: Status: RESOLVED | Noted: 2023-11-09 | Resolved: 2024-03-08

## 2024-03-08 PROCEDURE — 3008F BODY MASS INDEX DOCD: CPT | Mod: CPTII,,, | Performed by: INTERNAL MEDICINE

## 2024-03-08 PROCEDURE — 1101F PT FALLS ASSESS-DOCD LE1/YR: CPT | Mod: CPTII,,, | Performed by: INTERNAL MEDICINE

## 2024-03-08 PROCEDURE — 3074F SYST BP LT 130 MM HG: CPT | Mod: CPTII,,, | Performed by: INTERNAL MEDICINE

## 2024-03-08 PROCEDURE — 1160F RVW MEDS BY RX/DR IN RCRD: CPT | Mod: CPTII,,, | Performed by: INTERNAL MEDICINE

## 2024-03-08 PROCEDURE — 1159F MED LIST DOCD IN RCRD: CPT | Mod: CPTII,,, | Performed by: INTERNAL MEDICINE

## 2024-03-08 PROCEDURE — 99214 OFFICE O/P EST MOD 30 MIN: CPT | Mod: ,,, | Performed by: INTERNAL MEDICINE

## 2024-03-08 PROCEDURE — 3078F DIAST BP <80 MM HG: CPT | Mod: CPTII,,, | Performed by: INTERNAL MEDICINE

## 2024-03-08 PROCEDURE — 1126F AMNT PAIN NOTED NONE PRSNT: CPT | Mod: CPTII,,, | Performed by: INTERNAL MEDICINE

## 2024-03-08 PROCEDURE — 3288F FALL RISK ASSESSMENT DOCD: CPT | Mod: CPTII,,, | Performed by: INTERNAL MEDICINE

## 2024-03-08 RX ORDER — BUDESONIDE AND FORMOTEROL FUMARATE DIHYDRATE 160; 4.5 UG/1; UG/1
2 AEROSOL RESPIRATORY (INHALATION) EVERY 12 HOURS
Qty: 10.2 G | Refills: 3 | Status: SHIPPED | OUTPATIENT
Start: 2024-03-08 | End: 2025-03-08

## 2024-03-08 NOTE — PROGRESS NOTES
"Subjective:      Patient ID: Patrice L McIndoe is a 63 y.o. female.    Chief Complaint: Establish Care    HPI    specialist  Dr. Franks - vascular  Dr. Sorto - cardiology  hepatitis C treatment- ID treatment needed referred now  Heme/Onc: not on treatment, not followed by this   Dr. Rene Neurology  Psychiatry MD listed below      New Patient  Establish Care  Patient reports that her main concerns today is that she needs to get her neurologist evaluation completed and for the vascular doctor to work on her legs.  Patient lives alone with her dog.  She has daily home health care- cleaning, groceries etc.   She does drive  She has a lot of trouble walking more than "20 steps" because of cramping in her calves- she says related to her PAD   She says she is here today because she needs me to fill out paperwork for her to get some type of scooter to help her get from place to place- she says she did contact her isnurance and they were supposed to fax some paperwork over- I do not see that in office     Patient reports recently had acute onset of 6-8 hour R sided weakness/numbness - currently being work up for TIA, says she was told by cardiology heart was okay; she is getting work up thru Dr. Rene  -not on aspirin  -not on statin  They are Rx but she is not taking them says she did not know she was supposed to      C/O of claudication symptoms.  States leg pain when ambulating, relief with rest.   vascular following, she says they are waiting for neurology work up for stroke/TIA before add'l treatment       C/O of foot ulcers and calluses.  Denies any erythema, edema, or drainage.  Desires Podiatry referral.       HLD:  Denies any medication.       COPD/Tobacco User:  Smokes 1 PPD x 42 years (since age 10).  Diagnosed with COPD.  Not currently taking any inhaler.  Previously taking Symbicort inhaler.  Uses nebulizer prn.  C/O of chronic cough and wheezing.  Denies any SOB, CP, or Hemoptysis.    She was referred to " "Dr. Mayo  Did not make appt  Has number now to call     Valvular Heart Disease:  States hx of Endocarditis s/t to IV drug use.  Not currently following Cardiology.  Denies any [place], SOB, CP, or edema.     Hepatitis C: Denies any treatment; States "I carry it." Has not been treated for this problem     OA hips:  Daily, extreme pain.  States "bone on bone" for many years.  Not currently taking any medications.  States mobility issue and would like motility exam for motorized scooter.  She is requesting Naproxen (no recent labs)     Hx substance abuse:  Hx cocaine IV drug use.  States she would inject cocaine in veins.  States she is a hard stick and no recent labs.     ITP:  States diagnosed at young age; states low platelets on occasion. Denies any excessive bleeding.        Bipolar:  Takes Seroquel 200 mg po daily and Seroquel 300 mg po daily.  Denies any suicidal/homicidal ideations.  Her Physiatrist is Dr[facility] Lisa Jackson at [facility].                                                           Patient Care Team:  Mine Martinez MD as PCP - General (Family Medicine)  Clinc, [facility]  Lisa Strickland MD (Psychiatry)     Health Maintenance Due   Topic Date Due    Hepatitis C Screening  Never done    Lipid Panel  Never done    Pneumococcal Vaccines (Age 65+) (1 of 2 - PCV) Never done    HIV Screening  Never done    TETANUS VACCINE  Never done    Mammogram  Never done    DEXA Scan  Never done    Colorectal Cancer Screening  Never done    Shingles Vaccine (1 of 2) Never done    RSV Vaccine (Age 60+ and Pregnant patients) (1 - 1-dose 60+ series) Never done    COVID-19 Vaccine (2 - 2023-24 season) 09/01/2023     Review of Systems   Constitutional:  Negative for chills and fever.   HENT:  Negative for congestion, sinus pressure and sore throat.    Respiratory:  Positive for cough and wheezing. Negative for shortness of breath.    Cardiovascular:  Negative for chest pain and palpitations. " "  Gastrointestinal:  Negative for abdominal pain and nausea.   Skin:  Negative for rash.       Objective:     Vitals:    03/08/24 0944   BP: 124/76   BP Location: Left arm   Patient Position: Sitting   BP Method: Small (Manual)   Pulse: 73   Resp: (!) 22   SpO2: (!) 89%   Weight: 98.9 kg (218 lb)   Height: 5' 7" (1.702 m)     Physical Exam  Vitals and nursing note reviewed.   Constitutional:       General: She is not in acute distress.     Appearance: Normal appearance.   HENT:      Head: Normocephalic and atraumatic.   Skin:     Comments: Patient has small scabs on arms/legs, says " I pick all day"     Neurological:      Mental Status: She is alert.      Comments: Patient has no focal neurologic deficit  She does have some imbalance with walking on her own  Strength to bilateral LE is equal and intact 5/5 and to UE as well  She has good coordination         Assessment/Plan:     1. Mixed hyperlipidemia  -     Comprehensive Metabolic Panel; Future; Expected date: 03/08/2024  -     CBC Auto Differential; Future; Expected date: 03/08/2024  -     Hemoglobin A1C; Future; Expected date: 03/08/2024  -     Urinalysis; Future; Expected date: 03/08/2024  Labs ordered  Should be on statin therapy given history of TIA  2. Elevated fasting glucose  -     Hemoglobin A1C; Future; Expected date: 03/08/2024    3. Chronic obstructive pulmonary disease, unspecified COPD type  -     budesonide-formoterol 160-4.5 mcg (SYMBICORT) 160-4.5 mcg/actuation HFAA; Inhale 2 puffs into the lungs every 12 (twelve) hours. Controller  Dispense: 10.2 g; Refill: 3  Refilled inhaler  Schedule appt with pulmonary  4. TIA (transient ischemic attack)  Unchanged  Recommend f/u with neuro as planned for evaluation/treatment  5. Bipolar disorder, in partial remission, most recent episode depressed  Stable  Continue seroquel  6. PVD (peripheral vascular disease)  Unchanged  Needs to take statin and aspirin  7. Hepatitis C virus carrier state  -     Ambulatory " referral/consult to Infectious Disease; Future; Expected date: 03/15/2024  Refer to ID    Patient is currently undergoing work up for her TIA and PVDthat is severe. She does have other maintenance that needs to be done but we need to prioritize this for her.      Follow up in about 4 months (around 7/8/2024).    I spent a total of 40 minutes on the day of the visit.This includes face to face time and non-face to face time preparing to see the patient (eg, review of tests), obtaining and/or reviewing separately obtained history, documenting clinical information in the electronic or other health record, independently interpreting results and communicating results to the patient/family/caregiver, or care coordinator.

## 2024-03-12 ENCOUNTER — TELEPHONE (OUTPATIENT)
Dept: FAMILY MEDICINE | Facility: CLINIC | Age: 64
End: 2024-03-12
Payer: MEDICARE

## 2024-03-12 RX ORDER — IPRATROPIUM BROMIDE AND ALBUTEROL SULFATE 2.5; .5 MG/3ML; MG/3ML
3 SOLUTION RESPIRATORY (INHALATION) EVERY 6 HOURS PRN
Qty: 75 ML | Refills: 11 | Status: SHIPPED | OUTPATIENT
Start: 2024-03-12 | End: 2025-03-12

## 2024-03-12 NOTE — TELEPHONE ENCOUNTER
Requesting ipratropium bromide albuterol sulfate 0.5mg/3mg refills  Not on med list  Please advise

## 2024-03-12 NOTE — TELEPHONE ENCOUNTER
----- Message from Sylvia Keyes sent at 3/12/2024  9:53 AM CDT -----  .Type:  RX Refill Request    Who Called: pt  Refill or New Rx:refill  RX Name and Strength:ipratropium bromide albuterol sulfate inhalation solution 0.5 mg /3 mg  How is the patient currently taking it?   Is this a 30 day or 90 day RX:  Preferred Pharmacy with phone number:[facility] [address]  Local or Mail Order:local  Ordering Provider:  Would the patient rather a call back or a response via [facility]? Call back   Best Call Back Number:[phone]  Additional Information: refill request

## 2024-03-12 NOTE — TELEPHONE ENCOUNTER
I have signed for the following orders AND/OR meds. Please notify the patient and ask the patient to schedule the testing and/or information about any medications that were sent.      Medications Ordered This Encounter   Medications    albuterol-ipratropium (DUO-NEB) 2.5 mg-0.5 mg/3 mL nebulizer solution     Sig: Take 3 mLs by nebulization every 6 (six) hours as needed for Wheezing. Rescue     Dispense:  75 mL     Refill:  11     No orders of the defined types were placed in this encounter.

## 2024-03-14 ENCOUNTER — PATIENT OUTREACH (OUTPATIENT)
Dept: ADMINISTRATIVE | Facility: HOSPITAL | Age: 64
End: 2024-03-14
Payer: MEDICARE

## 2024-03-14 NOTE — PROGRESS NOTES
Population Health Chart Review & Patient Outreach Details      Additional [facility] Health Notes:    I attempted to contact patient to see if she has had a recent mmg or colonoscopy. No answer. Left Message.           Updates Requested / Reviewed:      Updated Care Coordination Note and Care Everywhere         Health Maintenance Topics Overdue:      VBHM Score: 3     Colon Cancer Screening  Osteoporosis Screening  Mammogram    Pneumonia Vaccine  Tetanus Vaccine  Shingles/Zoster Vaccine  RSV Vaccine            Health Maintenance Topic(s) Outreach Outcomes & Actions Taken:    Breast Cancer Screening - Outreach Outcomes & Actions Taken  : no answer left message    Colorectal Cancer Screening - Outreach Outcomes & Actions Taken  : no answer left message

## 2024-03-18 PROBLEM — G45.9 TIA (TRANSIENT ISCHEMIC ATTACK): Status: RESOLVED | Noted: 2023-12-14 | Resolved: 2024-03-18

## 2024-04-10 ENCOUNTER — TELEPHONE (OUTPATIENT)
Dept: FAMILY MEDICINE | Facility: CLINIC | Age: 64
End: 2024-04-10
Payer: MEDICARE

## 2024-04-10 NOTE — TELEPHONE ENCOUNTER
I have signed for the following orders AND/OR meds. Please notify the patient and ask the patient to schedule the testing and/or information about any medications that were sent.      Medications Ordered This Encounter   Medications    linaCLOtide (LINZESS) 72 mcg Cap capsule     Sig: Take 1 capsule (72 mcg total) by mouth before breakfast.     Dispense:  30 capsule     Refill:  2     No orders of the defined types were placed in this encounter.

## 2024-04-10 NOTE — TELEPHONE ENCOUNTER
Pt c/o constipation approx 1 week. Pt reported taking bisacodyl suppository for approx 2 days and has d/c due to side effect of nausea. Pt reported family history of constipation and is requesting Linzess sent to [facility] [place]. Will pt need appt? Please advise. Thanks

## 2024-04-10 NOTE — TELEPHONE ENCOUNTER
Pt denies nausea, vomiting, and abdominal pain. Pt stated she is able to pass gas. Pt notified Linzess will be e-prescribed to pharmacy of choice. Pt advised if any symptoms worsen or pt experiences nausea, vomiting, and abdominal pain, to report to [facility] for evaluation and possible abdominal x-ray. Pt verbalized understanding.

## 2024-04-10 NOTE — TELEPHONE ENCOUNTER
Please call patient  Is she having nausea, vomiting, has she stopped passing gas, is she having severe abdominal pain? If yes, needs evaluation now to rule out obstruction, at [facility] or ED  If she is not nauseated, not vomiting, she is able to pass gas, then we can try linzess and I can send it, however if this fails to improve, she needs to get [place] [facility] [place] need physical exam, and xray of the abdomen     thanks

## 2024-04-10 NOTE — TELEPHONE ENCOUNTER
----- Message from Linda De La Garza sent at 4/9/2024  4:00 PM CDT -----  Regarding: medical advice  Type:  Needs Medical Advice    Who Called: pt   Symptoms (please be specific): constipation    How long has patient had these symptoms:  couple days   Pharmacy name and phone #:  [facility] on [address]   Would the patient rather a call back or a response via [facility]? C/b   Best Call Back Number: [phone]  Additional Information: pt is calling in because she is constipated and have tried different things over the counter but nothing has worked and wants to know can she get Linzess prescribed to help her out.

## 2024-04-17 ENCOUNTER — TELEPHONE (OUTPATIENT)
Dept: FAMILY MEDICINE | Facility: CLINIC | Age: 64
End: 2024-04-17
Payer: MEDICARE

## 2024-04-17 NOTE — TELEPHONE ENCOUNTER
----- Message from Litzy Carter sent at 4/16/2024  9:52 AM CDT -----  Type:  Needs Medical Advice    Who Called:  pt    Would the patient rather a call back or a response via [facility]?      Best Call Back Number:  [phone]    Additional Information:  pt needs a paper stating she cannot walk from one step to another to give to her , please advise, thanks

## 2024-04-17 NOTE — LETTER
April 17, 2024    Patrice L McIndoe  [address]  [facility] [address]             [facility]  [address] [place] [address], [address]  [facility] [address]  Phone: [phone] To Whom It May Concern,    This letter is on behalf of my patient Ms. Patrice McIndoe [place] 1960. Ms. McIndoe has several chronic medical conditions limiting her ability to walk long distances. Patient does need a parking spot/opening in the front of her building to help accommodate her as this is medically necessary.     If you have any questions or concerns, please don't hesitate to call.    Sincerely,        Randy Shin MD

## 2024-04-17 NOTE — TELEPHONE ENCOUNTER
Spoke with pt  She stated that she is needing a letter stating that she needs to park in front of her building due to the fact she is unable to walk far distances  Pt stated that in order for her to get a parking spot assigned to her she needs a letter stating that it is deemed medically necessary  Please advise  Thanks

## 2024-04-18 ENCOUNTER — OFFICE VISIT (OUTPATIENT)
Dept: NEUROLOGY | Facility: CLINIC | Age: 64
End: 2024-04-18
Payer: MEDICARE

## 2024-04-18 VITALS
HEIGHT: 67 IN | WEIGHT: 220 LBS | BODY MASS INDEX: 34.53 KG/M2 | HEART RATE: 80 BPM | DIASTOLIC BLOOD PRESSURE: 107 MMHG | SYSTOLIC BLOOD PRESSURE: 165 MMHG

## 2024-04-18 DIAGNOSIS — I63.89 OTHER CEREBRAL INFARCTION: ICD-10-CM

## 2024-04-18 PROCEDURE — 3080F DIAST BP >= 90 MM HG: CPT | Mod: CPTII,S$GLB,, | Performed by: NURSE PRACTITIONER

## 2024-04-18 PROCEDURE — 99999 PR PBB SHADOW E&M-EST. PATIENT-LVL III: CPT | Mod: PBBFAC,,, | Performed by: NURSE PRACTITIONER

## 2024-04-18 PROCEDURE — 3008F BODY MASS INDEX DOCD: CPT | Mod: CPTII,S$GLB,, | Performed by: NURSE PRACTITIONER

## 2024-04-18 PROCEDURE — 1101F PT FALLS ASSESS-DOCD LE1/YR: CPT | Mod: CPTII,S$GLB,, | Performed by: NURSE PRACTITIONER

## 2024-04-18 PROCEDURE — 1159F MED LIST DOCD IN RCRD: CPT | Mod: CPTII,S$GLB,, | Performed by: NURSE PRACTITIONER

## 2024-04-18 PROCEDURE — 99214 OFFICE O/P EST MOD 30 MIN: CPT | Mod: S$GLB,,, | Performed by: NURSE PRACTITIONER

## 2024-04-18 PROCEDURE — 3077F SYST BP >= 140 MM HG: CPT | Mod: CPTII,S$GLB,, | Performed by: NURSE PRACTITIONER

## 2024-04-18 PROCEDURE — 3288F FALL RISK ASSESSMENT DOCD: CPT | Mod: CPTII,S$GLB,, | Performed by: NURSE PRACTITIONER

## 2024-04-18 RX ORDER — UREA 410 MG/G
CREAM TOPICAL
COMMUNITY
Start: 2023-11-28

## 2024-04-18 RX ORDER — ATORVASTATIN CALCIUM 40 MG/1
40 TABLET, FILM COATED ORAL DAILY
Qty: 90 TABLET | Refills: 3 | Status: SHIPPED | OUTPATIENT
Start: 2024-04-18 | End: 2025-04-18
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
## 2024-04-24 ENCOUNTER — OFFICE VISIT (OUTPATIENT)
Dept: VASCULAR SURGERY | Facility: CLINIC | Age: 64
End: 2024-04-24
Payer: MEDICARE

## 2024-04-24 VITALS
HEIGHT: 67 IN | SYSTOLIC BLOOD PRESSURE: 124 MMHG | DIASTOLIC BLOOD PRESSURE: 86 MMHG | HEART RATE: 82 BPM | BODY MASS INDEX: 34.53 KG/M2 | WEIGHT: 220 LBS

## 2024-04-24 DIAGNOSIS — F14.10 COCAINE ABUSE: ICD-10-CM

## 2024-04-24 DIAGNOSIS — I70.212 ATHEROSCLEROSIS OF NATIVE ARTERY OF LEFT LOWER EXTREMITY WITH INTERMITTENT CLAUDICATION: Primary | ICD-10-CM

## 2024-04-24 DIAGNOSIS — I70.211 ATHEROSCLEROSIS OF NATIVE ARTERY OF RIGHT LOWER EXTREMITY WITH INTERMITTENT CLAUDICATION: ICD-10-CM

## 2024-04-24 PROCEDURE — 99213 OFFICE O/P EST LOW 20 MIN: CPT | Mod: ,,, | Performed by: SURGERY

## 2024-04-24 PROCEDURE — 3008F BODY MASS INDEX DOCD: CPT | Mod: CPTII,,, | Performed by: SURGERY

## 2024-04-24 PROCEDURE — 3074F SYST BP LT 130 MM HG: CPT | Mod: CPTII,,, | Performed by: SURGERY

## 2024-04-24 PROCEDURE — 3079F DIAST BP 80-89 MM HG: CPT | Mod: CPTII,,, | Performed by: SURGERY

## 2024-04-24 PROCEDURE — 1160F RVW MEDS BY RX/DR IN RCRD: CPT | Mod: CPTII,,, | Performed by: SURGERY

## 2024-04-24 PROCEDURE — 1100F PTFALLS ASSESS-DOCD GE2>/YR: CPT | Mod: CPTII,,, | Performed by: SURGERY

## 2024-04-24 PROCEDURE — 1159F MED LIST DOCD IN RCRD: CPT | Mod: CPTII,,, | Performed by: SURGERY

## 2024-04-24 PROCEDURE — 3288F FALL RISK ASSESSMENT DOCD: CPT | Mod: CPTII,,, | Performed by: SURGERY

## 2024-04-25 DIAGNOSIS — I70.211 ATHEROSCLEROSIS OF NATIVE ARTERY OF RIGHT LOWER EXTREMITY WITH INTERMITTENT CLAUDICATION: Primary | ICD-10-CM

## 2024-04-25 DIAGNOSIS — F14.10 COCAINE ABUSE: ICD-10-CM

## 2024-04-25 DIAGNOSIS — I73.9 PVD (PERIPHERAL VASCULAR DISEASE): ICD-10-CM

## 2024-04-25 DIAGNOSIS — F14.93 COCAINE USE, UNSPECIFIED WITH WITHDRAWAL: ICD-10-CM

## 2024-04-25 RX ORDER — SODIUM CHLORIDE 9 MG/ML
INJECTION, SOLUTION INTRAVENOUS CONTINUOUS
Status: CANCELLED | OUTPATIENT
Start: 2024-05-09

## 2024-05-21 ENCOUNTER — TELEPHONE (OUTPATIENT)
Dept: VASCULAR SURGERY | Facility: CLINIC | Age: 64
End: 2024-05-21
Payer: MEDICARE

## 2024-05-21 NOTE — TELEPHONE ENCOUNTER
Patients procedure was cancelled with Dr. Franks due to patient using on 5/19/2024. She was originally scheduled on 5/23/2024. Scheduled patient for H&P update in the office on 5/29/2024 to discuss timeline of doing procedure.
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
## 2024-06-05 ENCOUNTER — OFFICE VISIT (OUTPATIENT)
Dept: VASCULAR SURGERY | Facility: CLINIC | Age: 64
End: 2024-06-05
Payer: MEDICARE

## 2024-06-05 VITALS
SYSTOLIC BLOOD PRESSURE: 141 MMHG | BODY MASS INDEX: 34.46 KG/M2 | DIASTOLIC BLOOD PRESSURE: 93 MMHG | HEART RATE: 87 BPM | HEIGHT: 67 IN

## 2024-06-05 DIAGNOSIS — I70.211 ATHEROSCLEROSIS OF NATIVE ARTERY OF RIGHT LOWER EXTREMITY WITH INTERMITTENT CLAUDICATION: Primary | ICD-10-CM

## 2024-06-05 DIAGNOSIS — I70.212 ATHEROSCLEROSIS OF NATIVE ARTERY OF LEFT LOWER EXTREMITY WITH INTERMITTENT CLAUDICATION: ICD-10-CM

## 2024-06-05 DIAGNOSIS — F14.10 COCAINE ABUSE: ICD-10-CM

## 2024-06-05 DIAGNOSIS — F14.93 COCAINE USE, UNSPECIFIED WITH WITHDRAWAL: ICD-10-CM

## 2024-06-05 PROCEDURE — 1160F RVW MEDS BY RX/DR IN RCRD: CPT | Mod: CPTII,,, | Performed by: SURGERY

## 2024-06-05 PROCEDURE — 1101F PT FALLS ASSESS-DOCD LE1/YR: CPT | Mod: CPTII,,, | Performed by: SURGERY

## 2024-06-05 PROCEDURE — 1159F MED LIST DOCD IN RCRD: CPT | Mod: CPTII,,, | Performed by: SURGERY

## 2024-06-05 PROCEDURE — 3080F DIAST BP >= 90 MM HG: CPT | Mod: CPTII,,, | Performed by: SURGERY

## 2024-06-05 PROCEDURE — 99213 OFFICE O/P EST LOW 20 MIN: CPT | Mod: ,,, | Performed by: SURGERY

## 2024-06-05 PROCEDURE — 3008F BODY MASS INDEX DOCD: CPT | Mod: CPTII,,, | Performed by: SURGERY

## 2024-06-05 PROCEDURE — 3288F FALL RISK ASSESSMENT DOCD: CPT | Mod: CPTII,,, | Performed by: SURGERY

## 2024-06-05 PROCEDURE — 3077F SYST BP >= 140 MM HG: CPT | Mod: CPTII,,, | Performed by: SURGERY

## 2024-06-05 RX ORDER — SODIUM CHLORIDE 9 MG/ML
INJECTION, SOLUTION INTRAVENOUS CONTINUOUS
Status: CANCELLED | OUTPATIENT
Start: 2024-06-13

## 2024-06-12 ENCOUNTER — LAB VISIT (OUTPATIENT)
Dept: LAB | Facility: HOSPITAL | Age: 64
End: 2024-06-12
Attending: SURGERY
Payer: MEDICARE

## 2024-06-12 DIAGNOSIS — I70.211 ATHEROSCLEROSIS OF NATIVE ARTERY OF RIGHT LOWER EXTREMITY WITH INTERMITTENT CLAUDICATION: ICD-10-CM

## 2024-06-12 LAB
ANION GAP SERPL CALC-SCNC: 7 MEQ/L
BUN SERPL-MCNC: 9.5 MG/DL (ref 9.8–20.1)
CALCIUM SERPL-MCNC: 9.6 MG/DL (ref 8.4–10.2)
CHLORIDE SERPL-SCNC: 107 MMOL/L (ref 98–107)
CO2 SERPL-SCNC: 27 MMOL/L (ref 23–31)
CREAT SERPL-MCNC: 0.72 MG/DL (ref 0.55–1.02)
CREAT/UREA NIT SERPL: 13
GFR SERPLBLD CREATININE-BSD FMLA CKD-EPI: >60 ML/MIN/1.73/M2
GLUCOSE SERPL-MCNC: 114 MG/DL (ref 82–115)
POTASSIUM SERPL-SCNC: 4.6 MMOL/L (ref 3.5–5.1)
SODIUM SERPL-SCNC: 141 MMOL/L (ref 136–145)

## 2024-06-12 PROCEDURE — 80048 BASIC METABOLIC PNL TOTAL CA: CPT

## 2024-06-12 PROCEDURE — 36415 COLL VENOUS BLD VENIPUNCTURE: CPT

## 2024-06-13 ENCOUNTER — HOSPITAL ENCOUNTER (OUTPATIENT)
Facility: HOSPITAL | Age: 64
Discharge: HOME | End: 2024-06-13
Attending: SURGERY | Admitting: SURGERY
Payer: MEDICARE

## 2024-06-13 VITALS
TEMPERATURE: 98 F | BODY MASS INDEX: 32.6 KG/M2 | HEIGHT: 67 IN | OXYGEN SATURATION: 91 % | WEIGHT: 207.69 LBS | SYSTOLIC BLOOD PRESSURE: 152 MMHG | HEART RATE: 91 BPM | DIASTOLIC BLOOD PRESSURE: 96 MMHG

## 2024-06-13 DIAGNOSIS — F14.93 COCAINE USE, UNSPECIFIED WITH WITHDRAWAL: ICD-10-CM

## 2024-06-13 DIAGNOSIS — I70.211 ATHEROSCLEROSIS OF NATIVE ARTERY OF RIGHT LOWER EXTREMITY WITH INTERMITTENT CLAUDICATION: ICD-10-CM

## 2024-06-13 DIAGNOSIS — F14.13 COCAINE ABUSE, UNSPECIFIED WITH WITHDRAWAL: ICD-10-CM

## 2024-06-13 DIAGNOSIS — F14.10 COCAINE ABUSE: ICD-10-CM

## 2024-06-13 DIAGNOSIS — I70.211 ATHEROSCLEROSIS OF NATIVE ARTERY OF RIGHT LOWER EXTREMITY WITH INTERMITTENT CLAUDICATION: Primary | ICD-10-CM

## 2024-06-13 DIAGNOSIS — Z01.818 PREOP EXAMINATION: ICD-10-CM

## 2024-06-13 LAB
AMPHET UR QL SCN: NEGATIVE
BARBITURATE SCN PRESENT UR: NEGATIVE
BENZODIAZ UR QL SCN: NEGATIVE
CANNABINOIDS UR QL SCN: POSITIVE
COCAINE UR QL SCN: POSITIVE
FENTANYL UR QL SCN: NEGATIVE
MDMA UR QL SCN: NEGATIVE
OPIATES UR QL SCN: NEGATIVE
PCP UR QL: NEGATIVE
PH UR: 5.5 [PH] (ref 3–11)
SPECIFIC GRAVITY, URINE AUTO (.000) (OHS): 1.03 (ref 1–1.03)

## 2024-06-13 PROCEDURE — 80307 DRUG TEST PRSMV CHEM ANLYZR: CPT | Performed by: SURGERY

## 2024-06-13 RX ORDER — SODIUM CHLORIDE 9 MG/ML
INJECTION, SOLUTION INTRAVENOUS CONTINUOUS
Status: DISCONTINUED | OUTPATIENT
Start: 2024-06-13 | End: 2024-06-13 | Stop reason: HOSPADM

## 2024-06-13 RX ORDER — SODIUM CHLORIDE 9 MG/ML
INJECTION, SOLUTION INTRAVENOUS CONTINUOUS
OUTPATIENT
Start: 2024-06-27

## 2024-06-17 DIAGNOSIS — F14.93 COCAINE USE, UNSPECIFIED WITH WITHDRAWAL: ICD-10-CM

## 2024-06-17 DIAGNOSIS — F14.10 COCAINE ABUSE: Primary | ICD-10-CM

## 2024-06-28 NOTE — PROGRESS NOTES
Patient is unable to proceed with procedure on 7/2/2024. Will schedule her for H&P update to be seen in the office prior to scheduling for procedure.
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
## 2024-07-08 ENCOUNTER — OFFICE VISIT (OUTPATIENT)
Dept: VASCULAR SURGERY | Facility: CLINIC | Age: 64
End: 2024-07-08
Payer: MEDICARE

## 2024-07-08 VITALS
SYSTOLIC BLOOD PRESSURE: 151 MMHG | DIASTOLIC BLOOD PRESSURE: 87 MMHG | HEIGHT: 67 IN | BODY MASS INDEX: 32.02 KG/M2 | HEART RATE: 89 BPM | WEIGHT: 204 LBS

## 2024-07-08 DIAGNOSIS — I70.212 ATHEROSCLEROSIS OF NATIVE ARTERY OF LEFT LOWER EXTREMITY WITH INTERMITTENT CLAUDICATION: ICD-10-CM

## 2024-07-08 DIAGNOSIS — F14.10 COCAINE ABUSE: ICD-10-CM

## 2024-07-08 DIAGNOSIS — I70.211 ATHEROSCLEROSIS OF NATIVE ARTERY OF RIGHT LOWER EXTREMITY WITH INTERMITTENT CLAUDICATION: Primary | ICD-10-CM

## 2024-07-08 PROCEDURE — 3079F DIAST BP 80-89 MM HG: CPT | Mod: CPTII,,, | Performed by: SURGERY

## 2024-07-08 PROCEDURE — 99213 OFFICE O/P EST LOW 20 MIN: CPT | Mod: ,,, | Performed by: SURGERY

## 2024-07-08 PROCEDURE — 1159F MED LIST DOCD IN RCRD: CPT | Mod: CPTII,,, | Performed by: SURGERY

## 2024-07-08 PROCEDURE — 3077F SYST BP >= 140 MM HG: CPT | Mod: CPTII,,, | Performed by: SURGERY

## 2024-07-08 PROCEDURE — 1101F PT FALLS ASSESS-DOCD LE1/YR: CPT | Mod: CPTII,,, | Performed by: SURGERY

## 2024-07-08 PROCEDURE — 3008F BODY MASS INDEX DOCD: CPT | Mod: CPTII,,, | Performed by: SURGERY

## 2024-07-08 PROCEDURE — 3288F FALL RISK ASSESSMENT DOCD: CPT | Mod: CPTII,,, | Performed by: SURGERY
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
## 2024-08-19 ENCOUNTER — OFFICE VISIT (OUTPATIENT)
Dept: VASCULAR SURGERY | Facility: CLINIC | Age: 64
End: 2024-08-19
Payer: MEDICARE

## 2024-08-19 VITALS
WEIGHT: 208 LBS | BODY MASS INDEX: 32.65 KG/M2 | SYSTOLIC BLOOD PRESSURE: 138 MMHG | DIASTOLIC BLOOD PRESSURE: 90 MMHG | HEIGHT: 67 IN | HEART RATE: 90 BPM

## 2024-08-19 DIAGNOSIS — F14.13 COCAINE ABUSE, UNSPECIFIED WITH WITHDRAWAL: ICD-10-CM

## 2024-08-19 DIAGNOSIS — I70.212 ATHEROSCLEROSIS OF NATIVE ARTERY OF LEFT LOWER EXTREMITY WITH INTERMITTENT CLAUDICATION: ICD-10-CM

## 2024-08-19 DIAGNOSIS — F14.10 COCAINE ABUSE: ICD-10-CM

## 2024-08-19 DIAGNOSIS — I70.211 ATHEROSCLEROSIS OF NATIVE ARTERY OF RIGHT LOWER EXTREMITY WITH INTERMITTENT CLAUDICATION: Primary | ICD-10-CM

## 2024-08-19 PROCEDURE — 3079F DIAST BP 80-89 MM HG: CPT | Mod: CPTII,,, | Performed by: SURGERY

## 2024-08-19 PROCEDURE — 99213 OFFICE O/P EST LOW 20 MIN: CPT | Mod: ,,, | Performed by: SURGERY

## 2024-08-19 PROCEDURE — 1160F RVW MEDS BY RX/DR IN RCRD: CPT | Mod: CPTII,,, | Performed by: SURGERY

## 2024-08-19 PROCEDURE — 1101F PT FALLS ASSESS-DOCD LE1/YR: CPT | Mod: CPTII,,, | Performed by: SURGERY

## 2024-08-19 PROCEDURE — 3008F BODY MASS INDEX DOCD: CPT | Mod: CPTII,,, | Performed by: SURGERY

## 2024-08-19 PROCEDURE — 1159F MED LIST DOCD IN RCRD: CPT | Mod: CPTII,,, | Performed by: SURGERY

## 2024-08-19 PROCEDURE — 3288F FALL RISK ASSESSMENT DOCD: CPT | Mod: CPTII,,, | Performed by: SURGERY

## 2024-08-19 PROCEDURE — 3075F SYST BP GE 130 - 139MM HG: CPT | Mod: CPTII,,, | Performed by: SURGERY

## 2024-08-19 RX ORDER — SODIUM CHLORIDE 9 MG/ML
INJECTION, SOLUTION INTRAVENOUS CONTINUOUS
OUTPATIENT
Start: 2024-08-19

## 2024-09-13 ENCOUNTER — TELEPHONE (OUTPATIENT)
Dept: VASCULAR SURGERY | Facility: CLINIC | Age: 64
End: 2024-09-13
Payer: MEDICARE

## 2024-09-13 ENCOUNTER — LAB VISIT (OUTPATIENT)
Dept: LAB | Facility: HOSPITAL | Age: 64
End: 2024-09-13
Attending: SURGERY
Payer: MEDICARE

## 2024-09-13 DIAGNOSIS — I70.211 ATHEROSCLEROSIS OF NATIVE ARTERY OF RIGHT LOWER EXTREMITY WITH INTERMITTENT CLAUDICATION: ICD-10-CM

## 2024-09-13 DIAGNOSIS — I70.212 ATHEROSCLEROSIS OF NATIVE ARTERY OF LEFT LOWER EXTREMITY WITH INTERMITTENT CLAUDICATION: ICD-10-CM

## 2024-09-13 LAB
ANION GAP SERPL CALC-SCNC: 5 MEQ/L
BUN SERPL-MCNC: 8.8 MG/DL (ref 9.8–20.1)
CALCIUM SERPL-MCNC: 9.4 MG/DL (ref 8.4–10.2)
CHLORIDE SERPL-SCNC: 104 MMOL/L (ref 98–107)
CO2 SERPL-SCNC: 30 MMOL/L (ref 23–31)
CREAT SERPL-MCNC: 0.74 MG/DL (ref 0.55–1.02)
CREAT/UREA NIT SERPL: 12
GFR SERPLBLD CREATININE-BSD FMLA CKD-EPI: >60 ML/MIN/1.73/M2
GLUCOSE SERPL-MCNC: 102 MG/DL (ref 82–115)
POTASSIUM SERPL-SCNC: 4.7 MMOL/L (ref 3.5–5.1)
SODIUM SERPL-SCNC: 139 MMOL/L (ref 136–145)

## 2024-09-13 PROCEDURE — 80048 BASIC METABOLIC PNL TOTAL CA: CPT

## 2024-09-13 PROCEDURE — 36415 COLL VENOUS BLD VENIPUNCTURE: CPT

## 2024-09-16 ENCOUNTER — HOSPITAL ENCOUNTER (OUTPATIENT)
Facility: HOSPITAL | Age: 64
Discharge: HOME | End: 2024-09-16
Attending: SURGERY | Admitting: SURGERY
Payer: MEDICARE

## 2024-09-16 ENCOUNTER — TELEPHONE (OUTPATIENT)
Dept: FAMILY MEDICINE | Facility: CLINIC | Age: 64
End: 2024-09-16
Payer: MEDICARE

## 2024-09-16 VITALS
SYSTOLIC BLOOD PRESSURE: 170 MMHG | TEMPERATURE: 98 F | DIASTOLIC BLOOD PRESSURE: 93 MMHG | OXYGEN SATURATION: 94 % | HEART RATE: 64 BPM

## 2024-09-16 DIAGNOSIS — F14.10 COCAINE ABUSE: ICD-10-CM

## 2024-09-16 DIAGNOSIS — F14.10 COCAINE ABUSE: Primary | ICD-10-CM

## 2024-09-16 DIAGNOSIS — F14.13 COCAINE ABUSE, UNSPECIFIED WITH WITHDRAWAL: ICD-10-CM

## 2024-09-16 DIAGNOSIS — I70.211 ATHEROSCLEROSIS OF NATIVE ARTERY OF RIGHT LOWER EXTREMITY WITH INTERMITTENT CLAUDICATION: ICD-10-CM

## 2024-09-16 LAB
AMPHET UR QL SCN: NEGATIVE
BARBITURATE SCN PRESENT UR: NEGATIVE
BENZODIAZ UR QL SCN: NEGATIVE
CANNABINOIDS UR QL SCN: NEGATIVE
COCAINE UR QL SCN: POSITIVE
FENTANYL UR QL SCN: NEGATIVE
MDMA UR QL SCN: NEGATIVE
OPIATES UR QL SCN: NEGATIVE
PCP UR QL: NEGATIVE
PH UR: 6 [PH] (ref 3–11)
SPECIFIC GRAVITY, URINE AUTO (.000) (OHS): >=1.03 (ref 1–1.03)

## 2024-09-16 PROCEDURE — 80307 DRUG TEST PRSMV CHEM ANLYZR: CPT | Performed by: SURGERY

## 2024-09-16 PROCEDURE — 99499 UNLISTED E&M SERVICE: CPT | Mod: ,,, | Performed by: SURGERY

## 2024-09-16 RX ORDER — SODIUM CHLORIDE 9 MG/ML
INJECTION, SOLUTION INTRAVENOUS CONTINUOUS
Status: DISCONTINUED | OUTPATIENT
Start: 2024-09-16 | End: 2024-09-19 | Stop reason: HOSPADM

## 2024-09-16 NOTE — TELEPHONE ENCOUNTER
Pt has not been seen since March and nothing scheduled moving forward  Does pt need appt  Please advise recommendations  Thanks

## 2024-09-16 NOTE — TELEPHONE ENCOUNTER
Randy Shin MD Dejesus, Heather, LPN  Caller: Unspecified (Today, 10:16 AM)  Yes needs OV  Okay to put with Lesly if I have no opening  Not sure what she was requesting, can you clarify?

## 2024-09-16 NOTE — TELEPHONE ENCOUNTER
----- Message from Karen Cook sent at 9/16/2024  8:56 AM CDT -----  Regarding: orders  .Who Called: Patrice L McIndoe    What order is the patient requesting: Power scooter   When does the expect the orders to be sent: [facility];  [address], [place] [address] Voice: [phone]. Fax: [phone]        Preferred Method of Contact: Phone Call  Patient's Preferred Phone Number on File: [phone]   Best Call Back Number, if different:  Additional Information: Pt also needing a order sent to her insurance comp, will be having a procedure for having blockage and can't walk nothing but about 10 ft. Would like a call from nurse once completed.

## 2024-09-16 NOTE — TELEPHONE ENCOUNTER
Pt requesting power scooter  Needing OV per Dr Shin  Please call pt to schedule  Ok with Lesly HARRIS   Thanks

## 2024-09-17 ENCOUNTER — OFFICE VISIT (OUTPATIENT)
Dept: FAMILY MEDICINE | Facility: CLINIC | Age: 64
End: 2024-09-17
Payer: MEDICARE

## 2024-09-17 VITALS
SYSTOLIC BLOOD PRESSURE: 126 MMHG | OXYGEN SATURATION: 95 % | RESPIRATION RATE: 18 BRPM | HEIGHT: 67 IN | BODY MASS INDEX: 32.02 KG/M2 | HEART RATE: 72 BPM | WEIGHT: 204 LBS | TEMPERATURE: 98 F | DIASTOLIC BLOOD PRESSURE: 80 MMHG

## 2024-09-17 DIAGNOSIS — I73.9 PVD (PERIPHERAL VASCULAR DISEASE): Primary | ICD-10-CM

## 2024-09-17 DIAGNOSIS — I38 VALVULAR HEART DISEASE: ICD-10-CM

## 2024-09-17 DIAGNOSIS — E78.2 MIXED HYPERLIPIDEMIA: ICD-10-CM

## 2024-09-17 DIAGNOSIS — Z13.6 ENCOUNTER FOR LIPID SCREENING FOR CARDIOVASCULAR DISEASE: ICD-10-CM

## 2024-09-17 DIAGNOSIS — Z13.220 ENCOUNTER FOR LIPID SCREENING FOR CARDIOVASCULAR DISEASE: ICD-10-CM

## 2024-09-17 DIAGNOSIS — F31.11 BIPOLAR AFFECTIVE DISORDER, CURRENTLY MANIC, MILD: ICD-10-CM

## 2024-09-17 DIAGNOSIS — J44.9 CHRONIC OBSTRUCTIVE PULMONARY DISEASE, UNSPECIFIED COPD TYPE: ICD-10-CM

## 2024-09-17 PROCEDURE — 1100F PTFALLS ASSESS-DOCD GE2>/YR: CPT | Mod: CPTII,,, | Performed by: INTERNAL MEDICINE

## 2024-09-17 PROCEDURE — 1159F MED LIST DOCD IN RCRD: CPT | Mod: CPTII,,, | Performed by: INTERNAL MEDICINE

## 2024-09-17 PROCEDURE — 3288F FALL RISK ASSESSMENT DOCD: CPT | Mod: CPTII,,, | Performed by: INTERNAL MEDICINE

## 2024-09-17 PROCEDURE — 3079F DIAST BP 80-89 MM HG: CPT | Mod: CPTII,,, | Performed by: INTERNAL MEDICINE

## 2024-09-17 PROCEDURE — 3008F BODY MASS INDEX DOCD: CPT | Mod: CPTII,,, | Performed by: INTERNAL MEDICINE

## 2024-09-17 PROCEDURE — 3074F SYST BP LT 130 MM HG: CPT | Mod: CPTII,,, | Performed by: INTERNAL MEDICINE

## 2024-09-17 PROCEDURE — 1160F RVW MEDS BY RX/DR IN RCRD: CPT | Mod: CPTII,,, | Performed by: INTERNAL MEDICINE

## 2024-09-17 PROCEDURE — 99214 OFFICE O/P EST MOD 30 MIN: CPT | Mod: ,,, | Performed by: INTERNAL MEDICINE

## 2024-09-17 NOTE — PROGRESS NOTES
"Subjective:      Patient ID: Patrice L McIndoe is a 64 y.o. female.    Chief Complaint: Fall    HPI  specialist  Dr. Franks - vascular  Dr. Sorto - cardiology  hepatitis C treatment- ID treatment needed referred now  Heme/Onc: not on treatment, not followed by this   Dr. Rene Neurology  Psychiatry MD listed below            Patient lives alone with her dog.  She has daily home health care- cleaning, groceries etc.   She does drive  She has a lot of trouble walking more than "20 steps" because of cramping in her calves- she says related to her PAD   She says she is here today because she needs me to fill out paperwork for her to get some type of scooter to help her get from place to place- she says she did contact her isnurance and they were supposed to fax some paperwork over- I do not see that in office      Patient reports recently had acute onset of 6-8 hour R sided weakness/numbness - currently being work up for TIA, says she was told by cardiology heart was okay; she is getting work up thru Dr. Rene  - on aspirin  - on statin       C/O of claudication symptoms.  States leg pain when ambulating, relief with rest.   vascular following, she says they are waiting for neurology work up for stroke/TIA before add'l treatment  -due for procedure for leg PAD but says that they have to wait until she is off cocaine long enough, so far has been one week        C/O of foot ulcers and calluses.  Denies any erythema, edema, or drainage.  Desires Podiatry referral.       HLD:  Denies any medication.       COPD/Tobacco User:  Smokes 1 PPD x 42 years (since age 10).  Diagnosed with COPD.  Not currently taking any inhaler.  Previously taking Symbicort inhaler.  Uses nebulizer prn.  C/O of chronic cough and wheezing.  Denies any SOB, CP, or Hemoptysis.    She was referred to Dr. Mayo  Did not make appt  Has number now to call     Valvular Heart Disease:  States hx of Endocarditis s/t to IV drug use.  Not currently " "following Cardiology.  Denies any [place], SOB, CP, or edema.  -needs new MD, previous cardiology is now out of network      Hepatitis C: Denies any treatment; States "I carry it." Has not been treated for this problem     OA hips:  Daily, extreme pain.  States "bone on bone" for many years.  Not currently taking any medications.  States mobility issue and would like motility exam for motorized scooter.  She is requesting Naproxen (no recent labs)     Hx substance abuse:  Hx cocaine IV drug use.  States she would inject cocaine in veins.  States she is a hard stick and no recent labs.     ITP:  States diagnosed at young age; states low platelets on occasion. Denies any excessive bleeding.        Bipolar:  Takes Seroquel 200 mg po daily and Seroquel 300 mg po daily.  Denies any suicidal/homicidal ideations.  Her Physiatrist is Dr. Lisa Jackson at [facility].                                                       Health Maintenance Due   Topic Date Due    Hepatitis C Screening  Never done    Lipid Panel  Never done    Pneumococcal Vaccines (Age 65+) (1 of 2 - PCV) Never done    TETANUS VACCINE  Never done    Mammogram  Never done    DEXA Scan  Never done    Colorectal Cancer Screening  Never done    Shingles Vaccine (1 of 2) Never done    RSV Vaccine (Age 60+ and Pregnant patients) (1 - 1-dose 60+ series) Never done    Influenza Vaccine (1) 09/01/2024    COVID-19 Vaccine (2 - 2023-24 season) 09/01/2024     Review of Systems   Constitutional:  Positive for fatigue. Negative for chills and fever.   Musculoskeletal:  Positive for myalgias.       Objective:     Vitals:    09/17/24 0857   BP: 126/80   BP Location: Left arm   Patient Position: Sitting   BP Method: Large (Automatic)   Pulse: 72   Resp: 18   Temp: 97.9 °F (36.6 °C)   TempSrc: Temporal   SpO2: 95%   Weight: 92.5 kg (204 lb)   Height: 5' 7" (1.702 m)     Physical Exam  Vitals and nursing note reviewed.   HENT:      Head: Normocephalic and atraumatic. "   Neurological:      Mental Status: She is alert.       Assessment/Plan:     1. PVD (peripheral vascular disease)  -     Lipid Panel; Future; Expected date: 09/17/2024  -     CBC Auto Differential; Future; Expected date: 09/17/2024  -     Ambulatory referral/consult to Cardiology; Future; Expected date: 09/24/2024  Patient is unable without pain/fatigue in legs, and also limited on how much she can walk at all. She is requesting scooter, will need to f/u on paperwork for this  She is scheduled for surgery with Dr. Franks to help correct some blood flow to her legs, pending  2. Encounter for lipid screening for cardiovascular disease  -     Lipid Panel; Future; Expected date: 09/17/2024  -     CBC Auto Differential; Future; Expected date: 09/17/2024    3. Mixed hyperlipidemia  -     Lipid Panel; Future; Expected date: 09/17/2024  -     CBC Auto Differential; Future; Expected date: 09/17/2024  -     Ambulatory referral/consult to Cardiology; Future; Expected date: 09/24/2024  Continue statin and low fat diet  Work on stop smoking and stop all cocaine/drug use  4. Valvular heart disease  -     Ambulatory referral/consult to Cardiology; Future; Expected date: 09/24/2024    5. Chronic obstructive pulmonary disease, unspecified COPD type  Unchanged  Work on tobacco cessation  F/u with pulmonary  6. Bipolar affective disorder, currently manic, mild  Stable continue psychiatry f/u     Follow up in about 6 months (around 3/17/2025) for Medicare Wellness.    I spent a total of 20 minutes on the day of the visit.This includes face to face time and non-face to face time preparing to see the patient (eg, review of tests), obtaining and/or reviewing separately obtained history, documenting clinical information in the electronic or other health record, independently interpreting results and communicating results to the patient/family/caregiver, or care coordinator.

## 2024-09-18 DIAGNOSIS — I70.211 ATHEROSCLEROSIS OF NATIVE ARTERY OF RIGHT LOWER EXTREMITY WITH INTERMITTENT CLAUDICATION: Primary | ICD-10-CM

## 2024-09-18 DIAGNOSIS — F14.10 COCAINE ABUSE: ICD-10-CM

## 2024-09-18 DIAGNOSIS — I63.89 OTHER CEREBRAL INFARCTION: ICD-10-CM

## 2024-09-18 DIAGNOSIS — F14.10 COCAINE ABUSE: Primary | ICD-10-CM

## 2024-09-18 DIAGNOSIS — F14.13 COCAINE ABUSE, UNSPECIFIED WITH WITHDRAWAL: ICD-10-CM

## 2024-09-18 RX ORDER — SODIUM CHLORIDE 9 MG/ML
INJECTION, SOLUTION INTRAVENOUS CONTINUOUS
OUTPATIENT
Start: 2024-09-18

## 2024-09-19 ENCOUNTER — TELEPHONE (OUTPATIENT)
Dept: FAMILY MEDICINE | Facility: CLINIC | Age: 64
End: 2024-09-19
Payer: MEDICARE

## 2024-09-19 NOTE — H&P
Patient was scheduled for procedure yesterday.  At the time she had an up-to-date H and P. however, her UDS came back positive and the procedure was canceled.  We will have the patient follow up in reschedule her procedure.

## 2024-09-19 NOTE — TELEPHONE ENCOUNTER
----- Message from Nikhil Domínguez sent at 9/19/2024  8:47 AM CDT -----  Who Called: Patrice L McIndoe    Caller is requesting assistance/information from provider's office.    Symptoms (please be specific):    How long has patient had these symptoms:    List of preferred pharmacies on file (remove unneeded): [unfilled]  If different, enter pharmacy into here including location and phone number:         Patient's Preferred Phone Number on File: [phone]   Best Call Back Number, if different:  Additional Information: called to [facility] order for mobility scooter , asked that weight is entered as 250 so she gets a heavy duty scooter.  Would like order faxed to , [facility] ,  F# [phone]. Would like order send to [facility] [place] and [facility], please follow up if needed

## 2024-09-19 NOTE — TELEPHONE ENCOUNTER
----- Message from Aurora Knox sent at 9/19/2024 10:46 AM CDT -----  Regarding: ref [id number]  .Type:  Needs Medical Advice    Who Called: pt  Symptoms (please be specific):     How long has patient had these symptoms:    Pharmacy name and phone #:    Would the patient rather a call back or a response via [facility]?   Best Call Back Number: [phone]   Additional Information: PA for surgery and electrical scooter

## 2024-09-23 ENCOUNTER — TELEPHONE (OUTPATIENT)
Dept: FAMILY MEDICINE | Facility: CLINIC | Age: 64
End: 2024-09-23
Payer: MEDICARE

## 2024-09-23 DIAGNOSIS — M16.0 PRIMARY OSTEOARTHRITIS OF BOTH HIPS: ICD-10-CM

## 2024-09-23 DIAGNOSIS — I73.9 PVD (PERIPHERAL VASCULAR DISEASE): Primary | ICD-10-CM

## 2024-09-23 NOTE — TELEPHONE ENCOUNTER
----- Message from Eri Huston sent at 9/23/2024 11:00 AM CDT -----  Regarding: prior authorization  Who Called: Wilbure L McIndoe    Caller is requesting assistance/information from provider's office.    Symptoms (please be specific):      How long has patient had these symptoms:      List of preferred pharmacies on file (remove unneeded): [unfilled]  If different, enter pharmacy into here including location and phone number:         Preferred Method of Contact: Phone Call  Patient's Preferred Phone Number on File: [phone]   Best Call Back Number, if different:  Additional Information: pt called to request that a PA be completed for a mobility scooter; she stated that she also needs her weight updated to 250 pounds, as she has gained weight and will need a heavy duty scooter; pt states that she is scheduled for surgery on this Friday and will be unable to walk; she also needs the [facility], as well. Please provide pt with an update, as she stated that she's called everyday for the past few days.      [facility]: Fax # [phone]    [facility]: Fax: [phone]

## 2024-09-23 NOTE — TELEPHONE ENCOUNTER
Spoke with pt  Advised that we have not received any paperwork from her insurance in regards to a power scooter  Pt stated that she misunderstood the instructions and there will be no paperwork from the insurance  Stated that we need to put in an order for a power scooter and send to the DME place which is [facility]  Also pt requesting for her weight to reflect 250 as that is what she usually weighs  Please advise  Thanks

## 2024-09-23 NOTE — TELEPHONE ENCOUNTER
I was previously waiting on paperwork from pts insurance   Pt called today stating that she misunderstood the instructions and that the insurance will not be sending any paperwork  Please telephone note from today 09.23.2024  Thanks

## 2024-09-23 NOTE — TELEPHONE ENCOUNTER
----- Message from Karen Cook sent at 9/23/2024  2:04 PM CDT -----  Regarding: med advice  .Who Called: Patrice L McIndoe    Caller is requesting assistance/information from provider's office.    Symptoms (please be specific):    How long has patient had these symptoms:    List of preferred pharmacies on file (remove unneeded): [unfilled]  If different, enter pharmacy into here including location and phone number:       Preferred Method of Contact: Phone Call  Patient's Preferred Phone Number on File: [phone]   Best Call Back Number, if different:  Additional Information: Pt needs to speak with nurse with about what she learned from [facility]. Wants to make sure a referral/order is sent to physical therapy to be weighed and measured for scooter and needs a order/referra sent [facility] for the scooter.

## 2024-09-23 NOTE — TELEPHONE ENCOUNTER
Lakeisha can you help clarify:  Patient needs DME for motorized wheelchair AND referral to a PT place?      I have signed for the following orders AND/OR meds. Please notify the patient and ask the patient to schedule the testing and/or information about any medications that were sent.         Orders Placed This Encounter   Procedures    MOTORIZED SCOOTER FOR HOME USE     Order Specific Question:   Height:     Answer:   1.7m     Order Specific Question:   Weight:     Answer:   215lbs     Order Specific Question:   Length of need (1-99 months):     Answer:   99    Ambulatory referral/consult to Physical/Occupational Therapy     Standing Status:   Future     Standing Expiration Date:   10/23/2025     Referral Priority:   Routine     Referral Type:   Physical Medicine     Referral Reason:   Specialty Services Required     Requested Specialty:   Physical Therapy     Number of Visits Requested:   1

## 2024-09-23 NOTE — ADDENDUM NOTE
Addended by: AL BRODERICK on: 9/23/2024 04:17 PM     Modules accepted: Orders     Gen: no acute distress  Head: normocephalic, atraumatic  Lung: CTAB, no respiratory distress, no wheezing, rales, rhonchi  CV: Tachycardic, irregular, no peripheral edema   Abd: soft, non-tender, non-distended  MSK: No edema, no visible deformities, full range of motion in all 4 extremities  Neuro: No focal neurologic deficits  Skin: No rash

## 2024-09-23 NOTE — TELEPHONE ENCOUNTER
Yes per pt request  She stated that the referral to the PT place is needed so they can measure & weigh her for the chair itself   And the order to send to the DME place  Thanks

## 2024-09-25 ENCOUNTER — TELEPHONE (OUTPATIENT)
Dept: FAMILY MEDICINE | Facility: CLINIC | Age: 64
End: 2024-09-25
Payer: MEDICARE

## 2024-09-25 ENCOUNTER — LAB VISIT (OUTPATIENT)
Dept: LAB | Facility: HOSPITAL | Age: 64
End: 2024-09-25
Payer: MEDICARE

## 2024-09-25 DIAGNOSIS — I73.9 PVD (PERIPHERAL VASCULAR DISEASE): ICD-10-CM

## 2024-09-25 DIAGNOSIS — Z13.6 ENCOUNTER FOR LIPID SCREENING FOR CARDIOVASCULAR DISEASE: ICD-10-CM

## 2024-09-25 DIAGNOSIS — E78.2 MIXED HYPERLIPIDEMIA: ICD-10-CM

## 2024-09-25 DIAGNOSIS — F14.10 COCAINE ABUSE: ICD-10-CM

## 2024-09-25 DIAGNOSIS — Z13.220 ENCOUNTER FOR LIPID SCREENING FOR CARDIOVASCULAR DISEASE: ICD-10-CM

## 2024-09-25 DIAGNOSIS — F14.13 COCAINE ABUSE, UNSPECIFIED WITH WITHDRAWAL: ICD-10-CM

## 2024-09-25 LAB
AMPHET UR QL SCN: NEGATIVE
BARBITURATE SCN PRESENT UR: NEGATIVE
BASOPHILS # BLD AUTO: 0.07 X10(3)/MCL
BASOPHILS NFR BLD AUTO: 0.7 %
BENZODIAZ UR QL SCN: NEGATIVE
CANNABINOIDS UR QL SCN: NEGATIVE
CHOLEST SERPL-MCNC: 205 MG/DL
CHOLEST/HDLC SERPL: 4 {RATIO} (ref 0–5)
COCAINE UR QL SCN: NEGATIVE
EOSINOPHIL # BLD AUTO: 0.19 X10(3)/MCL (ref 0–0.9)
EOSINOPHIL NFR BLD AUTO: 1.9 %
ERYTHROCYTE [DISTWIDTH] IN BLOOD BY AUTOMATED COUNT: 12.7 % (ref 11.5–17)
FENTANYL UR QL SCN: NEGATIVE
HCT VFR BLD AUTO: 51.6 % (ref 37–47)
HDLC SERPL-MCNC: 52 MG/DL (ref 35–60)
HGB BLD-MCNC: 16.6 G/DL (ref 12–16)
IMM GRANULOCYTES # BLD AUTO: 0.06 X10(3)/MCL (ref 0–0.04)
IMM GRANULOCYTES NFR BLD AUTO: 0.6 %
LDLC SERPL CALC-MCNC: 129 MG/DL (ref 50–140)
LYMPHOCYTES # BLD AUTO: 2.05 X10(3)/MCL (ref 0.6–4.6)
LYMPHOCYTES NFR BLD AUTO: 20.2 %
MCH RBC QN AUTO: 32 PG (ref 27–31)
MCHC RBC AUTO-ENTMCNC: 32.2 G/DL (ref 33–36)
MCV RBC AUTO: 99.4 FL (ref 80–94)
MDMA UR QL SCN: NEGATIVE
MONOCYTES # BLD AUTO: 1.36 X10(3)/MCL (ref 0.1–1.3)
MONOCYTES NFR BLD AUTO: 13.4 %
NEUTROPHILS # BLD AUTO: 6.41 X10(3)/MCL (ref 2.1–9.2)
NEUTROPHILS NFR BLD AUTO: 63.2 %
NRBC BLD AUTO-RTO: 0 %
OPIATES UR QL SCN: NEGATIVE
PCP UR QL: NEGATIVE
PH UR: 5 [PH] (ref 3–11)
PLATELET # BLD AUTO: 223 X10(3)/MCL (ref 130–400)
PMV BLD AUTO: 9.7 FL (ref 7.4–10.4)
RBC # BLD AUTO: 5.19 X10(6)/MCL (ref 4.2–5.4)
SPECIFIC GRAVITY, URINE AUTO (.000) (OHS): 1.02 (ref 1–1.03)
TRIGL SERPL-MCNC: 120 MG/DL (ref 37–140)
VLDLC SERPL CALC-MCNC: 24 MG/DL
WBC # BLD AUTO: 10.14 X10(3)/MCL (ref 4.5–11.5)

## 2024-09-25 PROCEDURE — 80307 DRUG TEST PRSMV CHEM ANLYZR: CPT

## 2024-09-25 PROCEDURE — 36415 COLL VENOUS BLD VENIPUNCTURE: CPT

## 2024-09-25 PROCEDURE — 85025 COMPLETE CBC W/AUTO DIFF WBC: CPT

## 2024-09-25 PROCEDURE — 80061 LIPID PANEL: CPT

## 2024-09-25 NOTE — TELEPHONE ENCOUNTER
----- Message from Nikhil Domínguez sent at 9/25/2024  9:43 AM CDT -----  Who Called: Patrice L McIndoe    Caller is requesting assistance/information from provider's office.    Symptoms (please be specific):    How long has patient had these symptoms:    List of preferred pharmacies on file (remove unneeded): [unfilled]  If different, enter pharmacy into here including location and phone number:         Patient's Preferred Phone Number on File: [phone]   Best Call Back Number, if different:  Additional Information: called to follow up referral for fiorella. Jordon referral is faxed to [facility]

## 2024-09-25 NOTE — TELEPHONE ENCOUNTER
Spoke with pt  Pt stated that everything is taken care of on our end since referral and order was sent to [facility]  Nothing more for us to do  Thanks

## 2024-09-26 ENCOUNTER — HOSPITAL ENCOUNTER (OUTPATIENT)
Facility: HOSPITAL | Age: 64
Discharge: HOME | End: 2024-09-26
Attending: SURGERY | Admitting: SURGERY
Payer: MEDICARE

## 2024-09-26 VITALS
TEMPERATURE: 99 F | HEART RATE: 75 BPM | WEIGHT: 214.31 LBS | SYSTOLIC BLOOD PRESSURE: 141 MMHG | OXYGEN SATURATION: 94 % | BODY MASS INDEX: 33.64 KG/M2 | DIASTOLIC BLOOD PRESSURE: 75 MMHG | HEIGHT: 67 IN | RESPIRATION RATE: 26 BRPM

## 2024-09-26 DIAGNOSIS — I70.211 ATHEROSCLEROSIS OF NATIVE ARTERY OF RIGHT LOWER EXTREMITY WITH INTERMITTENT CLAUDICATION: ICD-10-CM

## 2024-09-26 DIAGNOSIS — F14.10 COCAINE ABUSE: ICD-10-CM

## 2024-09-26 DIAGNOSIS — F14.13 COCAINE ABUSE, UNSPECIFIED WITH WITHDRAWAL: ICD-10-CM

## 2024-09-26 LAB
AMPHET UR QL SCN: NEGATIVE
BARBITURATE SCN PRESENT UR: NEGATIVE
BENZODIAZ UR QL SCN: NEGATIVE
CANNABINOIDS UR QL SCN: NEGATIVE
COCAINE UR QL SCN: NEGATIVE
FENTANYL UR QL SCN: NEGATIVE
MDMA UR QL SCN: NEGATIVE
OPIATES UR QL SCN: NEGATIVE
PCP UR QL: NEGATIVE
PH UR: 5 [PH] (ref 3–11)
SPECIFIC GRAVITY, URINE AUTO (.000) (OHS): 1.02 (ref 1–1.03)

## 2024-09-26 PROCEDURE — C1769 GUIDE WIRE: HCPCS | Performed by: SURGERY

## 2024-09-26 PROCEDURE — C1760 CLOSURE DEV, VASC: HCPCS | Performed by: SURGERY

## 2024-09-26 PROCEDURE — 37221 HC ILIAC REVASC W/STENT: CPT | Mod: RT | Performed by: SURGERY

## 2024-09-26 PROCEDURE — C1887 CATHETER, GUIDING: HCPCS | Performed by: SURGERY

## 2024-09-26 PROCEDURE — 99153 MOD SED SAME PHYS/QHP EA: CPT | Performed by: SURGERY

## 2024-09-26 PROCEDURE — 80307 DRUG TEST PRSMV CHEM ANLYZR: CPT | Performed by: SURGERY

## 2024-09-26 PROCEDURE — C1894 INTRO/SHEATH, NON-LASER: HCPCS | Performed by: SURGERY

## 2024-09-26 PROCEDURE — 27201423 OPTIME MED/SURG SUP & DEVICES STERILE SUPPLY: Performed by: SURGERY

## 2024-09-26 PROCEDURE — 75710 ARTERY X-RAYS ARM/LEG: CPT | Mod: 59 | Performed by: SURGERY

## 2024-09-26 PROCEDURE — 37221 PR REVASCULARIZE ILIAC ARTERY,ANGIOPLASTY/STENT, INITIAL VESSEL: CPT | Mod: RT,,, | Performed by: SURGERY

## 2024-09-26 PROCEDURE — 25500020 PHARM REV CODE 255: Performed by: SURGERY

## 2024-09-26 PROCEDURE — 99152 MOD SED SAME PHYS/QHP 5/>YRS: CPT | Mod: ,,, | Performed by: SURGERY

## 2024-09-26 PROCEDURE — C1876 STENT, NON-COA/NON-COV W/DEL: HCPCS | Performed by: SURGERY

## 2024-09-26 PROCEDURE — 63600175 PHARM REV CODE 636 W HCPCS: Performed by: SURGERY

## 2024-09-26 PROCEDURE — 99152 MOD SED SAME PHYS/QHP 5/>YRS: CPT | Performed by: SURGERY

## 2024-09-26 PROCEDURE — 25000003 PHARM REV CODE 250: Performed by: SURGERY

## 2024-09-26 PROCEDURE — 75710 ARTERY X-RAYS ARM/LEG: CPT | Mod: 26,59,RT, | Performed by: SURGERY

## 2024-09-26 DEVICE — PXB35-10-37-135 STENT VISI PRO 035 V01
Type: IMPLANTABLE DEVICE | Site: GROIN | Status: FUNCTIONAL
Brand: VISI-PRO™

## 2024-09-26 DEVICE — ANGIO-SEAL VIP VASCULAR CLOSURE DEVICE
Type: IMPLANTABLE DEVICE | Site: GROIN | Status: FUNCTIONAL
Brand: ANGIO-SEAL

## 2024-09-26 RX ORDER — GABAPENTIN 800 MG/1
800 TABLET ORAL DAILY PRN
COMMUNITY

## 2024-09-26 RX ORDER — HEPARIN SODIUM 1000 [USP'U]/ML
INJECTION, SOLUTION INTRAVENOUS; SUBCUTANEOUS
Status: DISCONTINUED | OUTPATIENT
Start: 2024-09-26 | End: 2024-09-26 | Stop reason: HOSPADM

## 2024-09-26 RX ORDER — LIDOCAINE HYDROCHLORIDE 10 MG/ML
INJECTION, SOLUTION INFILTRATION; PERINEURAL
Status: DISCONTINUED | OUTPATIENT
Start: 2024-09-26 | End: 2024-09-26 | Stop reason: HOSPADM

## 2024-09-26 RX ORDER — ACETAMINOPHEN 325 MG/1
650 TABLET ORAL EVERY 4 HOURS PRN
Status: DISCONTINUED | OUTPATIENT
Start: 2024-09-26 | End: 2024-09-26 | Stop reason: HOSPADM

## 2024-09-26 RX ORDER — SODIUM CHLORIDE 9 MG/ML
INJECTION, SOLUTION INTRAVENOUS CONTINUOUS
Status: DISCONTINUED | OUTPATIENT
Start: 2024-09-26 | End: 2024-09-26 | Stop reason: HOSPADM

## 2024-09-26 RX ORDER — MIDAZOLAM HYDROCHLORIDE 1 MG/ML
INJECTION INTRAMUSCULAR; INTRAVENOUS
Status: DISCONTINUED | OUTPATIENT
Start: 2024-09-26 | End: 2024-09-26 | Stop reason: HOSPADM

## 2024-09-26 RX ORDER — ONDANSETRON 4 MG/1
8 TABLET, ORALLY DISINTEGRATING ORAL EVERY 8 HOURS PRN
Status: DISCONTINUED | OUTPATIENT
Start: 2024-09-26 | End: 2024-09-26 | Stop reason: HOSPADM

## 2024-09-26 RX ORDER — HYDRALAZINE HYDROCHLORIDE 20 MG/ML
INJECTION INTRAMUSCULAR; INTRAVENOUS
Status: DISCONTINUED | OUTPATIENT
Start: 2024-09-26 | End: 2024-09-26 | Stop reason: HOSPADM

## 2024-09-26 RX ORDER — PROTAMINE SULFATE 10 MG/ML
INJECTION, SOLUTION INTRAVENOUS
Status: DISCONTINUED | OUTPATIENT
Start: 2024-09-26 | End: 2024-09-26 | Stop reason: HOSPADM

## 2024-09-26 RX ORDER — FENTANYL CITRATE 50 UG/ML
INJECTION, SOLUTION INTRAMUSCULAR; INTRAVENOUS
Status: DISCONTINUED | OUTPATIENT
Start: 2024-09-26 | End: 2024-09-26 | Stop reason: HOSPADM

## 2024-09-26 RX ORDER — IOPAMIDOL 755 MG/ML
INJECTION, SOLUTION INTRAVASCULAR
Status: DISCONTINUED | OUTPATIENT
Start: 2024-09-26 | End: 2024-09-26 | Stop reason: HOSPADM

## 2024-09-26 RX ADMIN — SODIUM CHLORIDE: 9 INJECTION, SOLUTION INTRAVENOUS at 05:09

## 2024-09-26 NOTE — OP NOTE
[facility] [place]    Patient: Patrice L McIndoe    Date of Procedure: 09/26/2024    Preop Diagnosis:  Peripheral arterial disease with lifestyle limiting claudication    Postop Diagnosis:  Same    Procedure: 1.  Moderate sedation directly supervised by me for 37 minutes   2.  Right common iliac angiography with angioplasty and stenting using a 10 x 37 mm visi pro balloon expandable bare metal stent   3.  Ultrasound-guided access of the right common femoral artery    Surgeon: Leonardo Franks    Indication for Procedure: Patrice L McIndoe is a 64 y.o. female  who presented to me with a known right common iliac occlusion and lifestyle limiting right lower extremity claudication.  She was taken for angiography and possible intervention..    Anesthesia:  Moderate sedation directly supervised by me and 1% lidocaine.  An independent observer was used to monitor cardiorespiratory function throughout the procedure.  Please see the procedure logs for timing and dosing of sedation medications as well as the name of the there was providing sedation.    Blood Loss:  10 mL    Findings:  Ultrasound evaluation of the right common femoral artery noted it to be patent.  Initial angiography noted the common iliac artery to be occluded.  The external and internal iliac arteries were patent.  The common femoral, profunda, and SFA were all patent on the right.  On completion there was a widely patent stent with flow from the aorta through the iliac system.    Implants:  Visi pro 10 x 37 mm balloon expandable stent    Closure: Angioseal    Procedure in Detail:  After informed consent was obtained, and risks and benefits discussed with the patient she was brought to the cath lab and placed supine.  After adequate support lines were placed, moderate sedation was begun directly supervised by me.  Ultrasound evaluation of the right common femoral artery was obtained.  Local anesthetic was infused in the skin overlying the  artery.  The artery was then accessed under direct ultrasound visualization using a micropuncture needle.  I then exchanged out for an 035 wire and placed a 5 Belgian sheath.  Angiography was then performed.  I then used a Glidewire advantage and a a cross catheter to traverse the occluded iliac.  I verified luminal placement via angiography in the aorta.  I exchanged out for a 7 Belgian 23 cm Brite tip sheath which I advanced over the wire into the aorta.  The patient was then systemically heparinized.  A 10 x 37 mm stent was then advanced into the sheath and the sheath withdrawn so that the stent was overlying the common iliac occlusion.  This was then deployed by inflating the balloon to 8 atmospheres.  The balloon was withdrawn angiography performed.  This had good luminal gain.  I then removed the sheath and deployed a Angio-Seal closure device.  Protamine was given.  The patient was transferred back to recovery in stable condition.    Complications: None    Contrast Volume: 60 mL    Specimens: None    Flouro Time: 3.7 min

## 2024-09-26 NOTE — DISCHARGE SUMMARY
[facility] - Cath Lab Services  Discharge Note  Short Stay    Procedure(s) (LRB):  ARTERIOGRAM-LEG AND ULTRASOUND (Right)      OUTCOME: Patient tolerated treatment/procedure well without complication and is now ready for discharge.    DISPOSITION: Home or Self Care    FINAL DIAGNOSIS:  Atherosclerosis of native artery of right lower extremity with intermittent claudication    FOLLOWUP: In clinic    DISCHARGE INSTRUCTIONS:    Discharge Procedure Orders   Diet general     Call MD for:  temperature >100.4     Call MD for:  severe uncontrolled pain     Call MD for:  redness, tenderness, or signs of infection (pain, swelling, redness, odor or green/yellow discharge around incision site)     No dressing needed     Activity as tolerated        TIME SPENT ON DISCHARGE: 2 minutes
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
## 2024-09-30 ENCOUNTER — TELEPHONE (OUTPATIENT)
Dept: VASCULAR SURGERY | Facility: CLINIC | Age: 64
End: 2024-09-30

## 2024-09-30 NOTE — TELEPHONE ENCOUNTER
Wilbure L McIndoe is s/p right angioplasty and stenting of common iliac artery on 9/26/2024. She was called regarding her post-operative status. She denied signs or symptoms of infection to right groin incision. Her dressing remains in place.She denies lower extremity swelling. States ambulating well with improvement in leg symptoms. Follow up information was confirmed 10/14/24. She was advised to call with any changes or concerns. She states understanding.

## 2024-10-04 ENCOUNTER — TELEPHONE (OUTPATIENT)
Dept: FAMILY MEDICINE | Facility: CLINIC | Age: 64
End: 2024-10-04
Payer: MEDICARE

## 2024-10-04 NOTE — TELEPHONE ENCOUNTER
----- Message from Indira sent at 10/4/2024  8:53 AM CDT -----  Regarding: Referral Update  Hello,     I reached out to [facility] to check on the status of referral scheduling. Clinic informed me patient denied scheduling at this time. The referral has been canceled.      Thank you,     Indira Saenz  Access Navigator  [facility]   Referral Scheduling Department

## 2024-10-08 ENCOUNTER — TELEPHONE (OUTPATIENT)
Dept: FAMILY MEDICINE | Facility: CLINIC | Age: 64
End: 2024-10-08
Payer: MEDICARE

## 2024-10-08 DIAGNOSIS — J44.9 CHRONIC OBSTRUCTIVE PULMONARY DISEASE, UNSPECIFIED COPD TYPE: Primary | ICD-10-CM

## 2024-10-08 NOTE — TELEPHONE ENCOUNTER
----- Message from Tech Keerthi sent at 10/7/2024  4:09 PM CDT -----  .Type:  Needs Medical Advice    Who Called:  pt    Symptoms (please be specific):  no     How long has patient had these symptoms:   no    Pharmacy name and phone #:   [facility]  Phone: [phone]    Would the patient rather a call back or a response via [facility]?      Best Call Back Number:  [phone]    Additional Information:  please send over a script for mouth piece and supplies to [facility]  Phone: [phone]

## 2024-10-09 ENCOUNTER — TELEPHONE (OUTPATIENT)
Dept: FAMILY MEDICINE | Facility: CLINIC | Age: 64
End: 2024-10-09
Payer: MEDICARE

## 2024-10-09 NOTE — TELEPHONE ENCOUNTER
----- Message from Benny sent at 10/9/2024  9:44 AM CDT -----  .Type:  Needs Medical Advice     Who Called: [facility]   Symptoms (please be specific):    How long has patient had these symptoms:    Pharmacy name and phone #:    Would the patient rather a call back or a response via [facility]?   Best Call Back Number: [phone]  Additional Information: They wanted to let the doctor know that there are out of network with her insurance. This was for her nebulizer.

## 2024-10-09 NOTE — TELEPHONE ENCOUNTER
----- Message from Benny sent at 10/9/2024  9:44 AM CDT -----  .Type:  Needs Medical Advice    Who Called: [facility]   Symptoms (please be specific):    How long has patient had these symptoms:    Pharmacy name and phone #:    Would the patient rather a call back or a response via [facility]?   Best Call Back Number: [phone]  Additional Information: They wanted to let the doctor know that there are out of network with her insurance. This was for her nebulizer.

## 2024-10-15 ENCOUNTER — TELEPHONE (OUTPATIENT)
Dept: FAMILY MEDICINE | Facility: CLINIC | Age: 64
End: 2024-10-15
Payer: MEDICARE

## 2024-10-15 ENCOUNTER — PATIENT MESSAGE (OUTPATIENT)
Dept: RESEARCH | Facility: HOSPITAL | Age: 64
End: 2024-10-15
Payer: MEDICARE
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
## 2024-10-15 NOTE — TELEPHONE ENCOUNTER
----- Message from Aurora sent at 10/15/2024  1:34 PM CDT -----  Regarding: [facility] - shayla  .Type:  Needs Medical Advice    Who Called: [facility]   Symptoms (please be specific):    How long has patient had these symptoms:    Pharmacy name and phone #:    Would the patient rather a call back or a response via [facility]? no  Best Call Back Number: [phone]  Additional Information: sent over fax for med equipment

## 2024-10-16 ENCOUNTER — OFFICE VISIT (OUTPATIENT)
Dept: VASCULAR SURGERY | Facility: CLINIC | Age: 64
End: 2024-10-16
Payer: MEDICARE

## 2024-10-16 VITALS — HEART RATE: 78 BPM | SYSTOLIC BLOOD PRESSURE: 143 MMHG | DIASTOLIC BLOOD PRESSURE: 83 MMHG

## 2024-10-16 DIAGNOSIS — I97.89 POSTOPERATIVE SURGICAL COMPLICATION INVOLVING CIRCULATORY SYSTEM ASSOCIATED WITH CIRCULATORY PROCEDURE, UNSPECIFIED COMPLICATION: Primary | ICD-10-CM

## 2024-10-16 DIAGNOSIS — I70.211 ATHEROSCLEROSIS OF NATIVE ARTERY OF RIGHT LOWER EXTREMITY WITH INTERMITTENT CLAUDICATION: Primary | ICD-10-CM

## 2024-10-16 PROCEDURE — 3288F FALL RISK ASSESSMENT DOCD: CPT | Mod: CPTII,,, | Performed by: SURGERY

## 2024-10-16 PROCEDURE — 3077F SYST BP >= 140 MM HG: CPT | Mod: CPTII,,, | Performed by: SURGERY

## 2024-10-16 PROCEDURE — 1159F MED LIST DOCD IN RCRD: CPT | Mod: CPTII,,, | Performed by: SURGERY

## 2024-10-16 PROCEDURE — 1160F RVW MEDS BY RX/DR IN RCRD: CPT | Mod: CPTII,,, | Performed by: SURGERY

## 2024-10-16 PROCEDURE — 1101F PT FALLS ASSESS-DOCD LE1/YR: CPT | Mod: CPTII,,, | Performed by: SURGERY

## 2024-10-16 PROCEDURE — 99213 OFFICE O/P EST LOW 20 MIN: CPT | Mod: ,,, | Performed by: SURGERY

## 2024-10-16 PROCEDURE — 3079F DIAST BP 80-89 MM HG: CPT | Mod: CPTII,,, | Performed by: SURGERY

## 2024-10-18 ENCOUNTER — TELEPHONE (OUTPATIENT)
Dept: FAMILY MEDICINE | Facility: CLINIC | Age: 64
End: 2024-10-18
Payer: MEDICARE

## 2024-10-18 NOTE — TELEPHONE ENCOUNTER
----- Message from Chrissy sent at 10/18/2024  9:18 AM CDT -----  .Who Called: Wilbur L McIndoe    Patient is returning phone call    Who Left Message for Patient:  Does the patient know what this is regarding?:wants to make sure that the paperwork for the scooter was dropped off and signed. They are needing this paperwork in order for the pt to get the scooter.       Preferred Method of Contact: Phone Call  Patient's Preferred Phone Number on File: [phone]   Best Call Back Number, if different:  Additional Information:

## 2024-10-18 NOTE — TELEPHONE ENCOUNTER
Spoke with Sukhwinder @ [facility]   Requesting last 2 OV notes  Notes routed to fax number provided  Thanks

## 2025-02-04 ENCOUNTER — TELEPHONE (OUTPATIENT)
Dept: FAMILY MEDICINE | Facility: CLINIC | Age: 65
End: 2025-02-04
Payer: MEDICARE

## 2025-02-04 NOTE — TELEPHONE ENCOUNTER
----- Message from Nikhil sent at 2/4/2025  2:27 PM CST -----  Who Called: Patrice L McIndoe    Refill or New Rx: New  RX Name and Strength:naproxen (NAPROSYN) 500 MG table  How is the patient currently taking it? (ex. 1XDay):2x  Is this a 30 day or 90 day RX:  Local or Mail Order:  List of preferred pharmacies on file (remove unneeded): [unfilled]  If different Pharmacy is requested, enter Pharmacy information here including location and phone number: [facility] [address]   Ordering Provider:[facility]        Patient's Preferred Phone Number on File: [phone]   Best Call Back Number, if different:  Additional Information:

## 2025-02-04 NOTE — TELEPHONE ENCOUNTER
Spoke with pt  Requesting refill on Naprosyn to hold her over until she can get into orthopedic clinic  Pt stated that she had to reschedule her appt with orthopedics due to transportation issues and they can't get her in until April  Pt c/o knee, hip & elbow pain - pt stated this pain is chronic and not anything new  Stated that Naprosyn is the only medication that helps to lessen her pain  Please advise recommendations   Thanks

## 2025-02-05 NOTE — TELEPHONE ENCOUNTER
Lakeisha please call patient please let her know that naprosyn is not safe to use with her other medical conditions including arterial disease; unfortunately tylenol 500 mg every 8 hours as needed for pain relief is the only OTC option that I can advise upon at this time.we can refer her to a MD in [facility] to discuss medication options if she would prefer.

## 2025-02-06 NOTE — TELEPHONE ENCOUNTER
Spoke to pt  Advised that naprosyn is not safe to use with her other medical conditions including arterial disease; unfortunately tylenol 500 mg every 8 hours as needed for pain relief is the only OTC option that Dr Shin can advise upon at this time.we can refer her to a MD in [facility] to discuss medication options if she would prefer  Pt expressed understanding to all of the above  Thanks

## 2025-03-18 ENCOUNTER — OFFICE VISIT (OUTPATIENT)
Dept: FAMILY MEDICINE | Facility: CLINIC | Age: 65
End: 2025-03-18
Payer: MEDICARE

## 2025-03-18 VITALS
BODY MASS INDEX: 32.22 KG/M2 | DIASTOLIC BLOOD PRESSURE: 82 MMHG | OXYGEN SATURATION: 94 % | SYSTOLIC BLOOD PRESSURE: 126 MMHG | HEIGHT: 67 IN | HEART RATE: 88 BPM | WEIGHT: 205.31 LBS

## 2025-03-18 DIAGNOSIS — E78.2 MIXED HYPERLIPIDEMIA: ICD-10-CM

## 2025-03-18 DIAGNOSIS — R73.01 ELEVATED FASTING GLUCOSE: ICD-10-CM

## 2025-03-18 DIAGNOSIS — F17.200 SMOKING: ICD-10-CM

## 2025-03-18 DIAGNOSIS — Z12.31 ENCOUNTER FOR SCREENING MAMMOGRAM FOR BREAST CANCER: ICD-10-CM

## 2025-03-18 DIAGNOSIS — Z12.11 COLON CANCER SCREENING: ICD-10-CM

## 2025-03-18 DIAGNOSIS — J44.9 CHRONIC OBSTRUCTIVE PULMONARY DISEASE, UNSPECIFIED COPD TYPE: ICD-10-CM

## 2025-03-18 DIAGNOSIS — N95.9 UNSPECIFIED MENOPAUSAL AND PERIMENOPAUSAL DISORDER: ICD-10-CM

## 2025-03-18 DIAGNOSIS — I70.211 ATHEROSCLEROSIS OF NATIVE ARTERY OF RIGHT LOWER EXTREMITY WITH INTERMITTENT CLAUDICATION: ICD-10-CM

## 2025-03-18 DIAGNOSIS — R41.3 MEMORY LOSS: ICD-10-CM

## 2025-03-18 DIAGNOSIS — I73.9 PVD (PERIPHERAL VASCULAR DISEASE): ICD-10-CM

## 2025-03-18 DIAGNOSIS — Z00.00 WELLNESS EXAMINATION: Primary | ICD-10-CM

## 2025-03-18 DIAGNOSIS — M54.12 CERVICAL RADICULOPATHY: ICD-10-CM

## 2025-03-18 RX ORDER — GABAPENTIN 300 MG/1
300 CAPSULE ORAL 2 TIMES DAILY
Qty: 60 CAPSULE | Refills: 11 | Status: SHIPPED | OUTPATIENT
Start: 2025-03-18 | End: 2026-03-18
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
## 2025-04-02 ENCOUNTER — TELEPHONE (OUTPATIENT)
Dept: FAMILY MEDICINE | Facility: CLINIC | Age: 65
End: 2025-04-02
Payer: MEDICARE

## 2025-04-02 NOTE — TELEPHONE ENCOUNTER
----- Message from David Seals sent at 4/1/2025  3:09 PM CDT -----  Who Called: Patrice L McIndoeCaller is requesting assistance/information from provider's office.Symptoms (please be specific): N/A How long has patient had these symptoms:  N/AList of preferred pharmacies on file (remove unneeded): [unfilled]If different, enter pharmacy into here including location and phone number: N/APreferred Method of Contact: Phone CallPatient's Preferred Phone Number on File: [phone] Best Call Back Number, if different:Additional Information: pt would like a cb in regards to paperwork about service animal  
I spoke with patient and advised her that I will place her letter and appointment card in the mail today. I advised her it was mailed, but was sent back to our office. She voiced understanding. Gilda  
[facility]

## 2025-07-03 DIAGNOSIS — I63.89 OTHER CEREBRAL INFARCTION: ICD-10-CM

## 2025-07-03 RX ORDER — ATORVASTATIN CALCIUM 40 MG/1
40 TABLET, FILM COATED ORAL
Qty: 90 TABLET | Refills: 3 | Status: SHIPPED | OUTPATIENT
Start: 2025-07-03

## (undated) DEVICE — CATH 6FR PIGTAIL

## (undated) DEVICE — CANNULA ADULT NASAL 7FT

## (undated) DEVICE — GLIDESHEATH 5FR PINNACLE 10CM

## (undated) DEVICE — GLIDE CATH ANGLED 4FR 65CM

## (undated) DEVICE — SHEATH BRITE TIP 7FR 23CM

## (undated) DEVICE — DEVICE BASIXCOMPAK INFL 20ML

## (undated) DEVICE — Device

## (undated) DEVICE — GUIDEWIRE ADVNTG 035X260CM ANG

## (undated) DEVICE — KIT MINI STICK MAX 5F 21GX10CM

## (undated) DEVICE — CATH QUICK-CROSS .035 X 90